# Patient Record
Sex: MALE | Race: WHITE | NOT HISPANIC OR LATINO | Employment: OTHER | URBAN - METROPOLITAN AREA
[De-identification: names, ages, dates, MRNs, and addresses within clinical notes are randomized per-mention and may not be internally consistent; named-entity substitution may affect disease eponyms.]

---

## 2022-12-01 ENCOUNTER — OUTSIDE PLACE OF SERVICE (OUTPATIENT)
Dept: NEPHROLOGY | Facility: CLINIC | Age: 72
End: 2022-12-01

## 2022-12-17 PROCEDURE — 99232 SBSQ HOSP IP/OBS MODERATE 35: CPT | Mod: ,,, | Performed by: INTERNAL MEDICINE

## 2022-12-17 PROCEDURE — 99232 PR SUBSEQUENT HOSPITAL CARE,LEVL II: ICD-10-PCS | Mod: ,,, | Performed by: INTERNAL MEDICINE

## 2022-12-18 PROCEDURE — 99232 SBSQ HOSP IP/OBS MODERATE 35: CPT | Mod: ,,, | Performed by: INTERNAL MEDICINE

## 2022-12-18 PROCEDURE — 99232 PR SUBSEQUENT HOSPITAL CARE,LEVL II: ICD-10-PCS | Mod: ,,, | Performed by: INTERNAL MEDICINE

## 2022-12-19 PROCEDURE — 99232 SBSQ HOSP IP/OBS MODERATE 35: CPT | Mod: ,,, | Performed by: INTERNAL MEDICINE

## 2022-12-19 PROCEDURE — 99232 PR SUBSEQUENT HOSPITAL CARE,LEVL II: ICD-10-PCS | Mod: ,,, | Performed by: INTERNAL MEDICINE

## 2022-12-20 PROCEDURE — 99232 PR SUBSEQUENT HOSPITAL CARE,LEVL II: ICD-10-PCS | Mod: ,,, | Performed by: INTERNAL MEDICINE

## 2022-12-20 PROCEDURE — 99232 SBSQ HOSP IP/OBS MODERATE 35: CPT | Mod: ,,, | Performed by: INTERNAL MEDICINE

## 2022-12-21 PROCEDURE — 99232 SBSQ HOSP IP/OBS MODERATE 35: CPT | Mod: ,,, | Performed by: INTERNAL MEDICINE

## 2022-12-21 PROCEDURE — 99232 PR SUBSEQUENT HOSPITAL CARE,LEVL II: ICD-10-PCS | Mod: ,,, | Performed by: INTERNAL MEDICINE

## 2022-12-22 PROCEDURE — 99232 SBSQ HOSP IP/OBS MODERATE 35: CPT | Mod: ,,, | Performed by: INTERNAL MEDICINE

## 2022-12-22 PROCEDURE — 99232 PR SUBSEQUENT HOSPITAL CARE,LEVL II: ICD-10-PCS | Mod: ,,, | Performed by: INTERNAL MEDICINE

## 2022-12-23 PROCEDURE — 99232 SBSQ HOSP IP/OBS MODERATE 35: CPT | Mod: ,,, | Performed by: INTERNAL MEDICINE

## 2022-12-23 PROCEDURE — 99232 PR SUBSEQUENT HOSPITAL CARE,LEVL II: ICD-10-PCS | Mod: ,,, | Performed by: INTERNAL MEDICINE

## 2022-12-24 PROCEDURE — 99232 PR SUBSEQUENT HOSPITAL CARE,LEVL II: ICD-10-PCS | Mod: ,,, | Performed by: INTERNAL MEDICINE

## 2022-12-24 PROCEDURE — 99232 SBSQ HOSP IP/OBS MODERATE 35: CPT | Mod: ,,, | Performed by: INTERNAL MEDICINE

## 2022-12-25 PROCEDURE — 99232 PR SUBSEQUENT HOSPITAL CARE,LEVL II: ICD-10-PCS | Mod: ,,, | Performed by: INTERNAL MEDICINE

## 2022-12-25 PROCEDURE — 99232 SBSQ HOSP IP/OBS MODERATE 35: CPT | Mod: ,,, | Performed by: INTERNAL MEDICINE

## 2022-12-26 PROCEDURE — 99232 PR SUBSEQUENT HOSPITAL CARE,LEVL II: ICD-10-PCS | Mod: ,,, | Performed by: INTERNAL MEDICINE

## 2022-12-26 PROCEDURE — 99232 SBSQ HOSP IP/OBS MODERATE 35: CPT | Mod: ,,, | Performed by: INTERNAL MEDICINE

## 2022-12-27 PROCEDURE — 99232 SBSQ HOSP IP/OBS MODERATE 35: CPT | Mod: ,,, | Performed by: INTERNAL MEDICINE

## 2022-12-27 PROCEDURE — 99232 PR SUBSEQUENT HOSPITAL CARE,LEVL II: ICD-10-PCS | Mod: ,,, | Performed by: INTERNAL MEDICINE

## 2022-12-28 PROCEDURE — 99232 SBSQ HOSP IP/OBS MODERATE 35: CPT | Mod: ,,, | Performed by: INTERNAL MEDICINE

## 2022-12-28 PROCEDURE — 99232 PR SUBSEQUENT HOSPITAL CARE,LEVL II: ICD-10-PCS | Mod: ,,, | Performed by: INTERNAL MEDICINE

## 2022-12-29 PROCEDURE — 99232 PR SUBSEQUENT HOSPITAL CARE,LEVL II: ICD-10-PCS | Mod: ,,, | Performed by: INTERNAL MEDICINE

## 2022-12-29 PROCEDURE — 99232 SBSQ HOSP IP/OBS MODERATE 35: CPT | Mod: ,,, | Performed by: INTERNAL MEDICINE

## 2023-07-05 ENCOUNTER — TELEPHONE (OUTPATIENT)
Dept: RADIATION ONCOLOGY | Facility: CLINIC | Age: 73
End: 2023-07-05
Payer: MEDICARE

## 2023-07-05 DIAGNOSIS — C44.91 BASAL CELL CARCINOMA: Primary | ICD-10-CM

## 2023-07-06 ENCOUNTER — TELEPHONE (OUTPATIENT)
Dept: RADIATION ONCOLOGY | Facility: CLINIC | Age: 73
End: 2023-07-06
Payer: MEDICARE

## 2023-07-07 ENCOUNTER — TELEPHONE (OUTPATIENT)
Dept: RADIATION ONCOLOGY | Facility: CLINIC | Age: 73
End: 2023-07-07
Payer: MEDICARE

## 2023-07-19 ENCOUNTER — OFFICE VISIT (OUTPATIENT)
Dept: RADIATION ONCOLOGY | Facility: CLINIC | Age: 73
End: 2023-07-19
Payer: MEDICARE

## 2023-07-19 ENCOUNTER — HOSPITAL ENCOUNTER (OUTPATIENT)
Dept: RADIOLOGY | Facility: HOSPITAL | Age: 73
Discharge: HOME | End: 2023-07-19
Attending: STUDENT IN AN ORGANIZED HEALTH CARE EDUCATION/TRAINING PROGRAM
Payer: MEDICARE

## 2023-07-19 VITALS
OXYGEN SATURATION: 98 % | TEMPERATURE: 98 F | DIASTOLIC BLOOD PRESSURE: 77 MMHG | SYSTOLIC BLOOD PRESSURE: 156 MMHG | WEIGHT: 181.69 LBS | HEART RATE: 52 BPM

## 2023-07-19 DIAGNOSIS — R22.1 NECK MASS: ICD-10-CM

## 2023-07-19 DIAGNOSIS — C44.90 SKIN CANCER: ICD-10-CM

## 2023-07-19 DIAGNOSIS — R22.1 NECK MASS: Primary | ICD-10-CM

## 2023-07-19 PROCEDURE — 3288F PR FALLS RISK ASSESSMENT DOCUMENTED: ICD-10-PCS | Mod: CPTII,S$GLB,, | Performed by: STUDENT IN AN ORGANIZED HEALTH CARE EDUCATION/TRAINING PROGRAM

## 2023-07-19 PROCEDURE — 3078F PR MOST RECENT DIASTOLIC BLOOD PRESSURE < 80 MM HG: ICD-10-PCS | Mod: CPTII,S$GLB,, | Performed by: STUDENT IN AN ORGANIZED HEALTH CARE EDUCATION/TRAINING PROGRAM

## 2023-07-19 PROCEDURE — 3078F DIAST BP <80 MM HG: CPT | Mod: CPTII,S$GLB,, | Performed by: STUDENT IN AN ORGANIZED HEALTH CARE EDUCATION/TRAINING PROGRAM

## 2023-07-19 PROCEDURE — 3077F PR MOST RECENT SYSTOLIC BLOOD PRESSURE >= 140 MM HG: ICD-10-PCS | Mod: CPTII,S$GLB,, | Performed by: STUDENT IN AN ORGANIZED HEALTH CARE EDUCATION/TRAINING PROGRAM

## 2023-07-19 PROCEDURE — 1126F PR PAIN SEVERITY QUANTIFIED, NO PAIN PRESENT: ICD-10-PCS | Mod: CPTII,S$GLB,, | Performed by: STUDENT IN AN ORGANIZED HEALTH CARE EDUCATION/TRAINING PROGRAM

## 2023-07-19 PROCEDURE — 99205 OFFICE O/P NEW HI 60 MIN: CPT | Mod: S$GLB,,, | Performed by: STUDENT IN AN ORGANIZED HEALTH CARE EDUCATION/TRAINING PROGRAM

## 2023-07-19 PROCEDURE — 1159F PR MEDICATION LIST DOCUMENTED IN MEDICAL RECORD: ICD-10-PCS | Mod: CPTII,S$GLB,, | Performed by: STUDENT IN AN ORGANIZED HEALTH CARE EDUCATION/TRAINING PROGRAM

## 2023-07-19 PROCEDURE — 1159F MED LIST DOCD IN RCRD: CPT | Mod: CPTII,S$GLB,, | Performed by: STUDENT IN AN ORGANIZED HEALTH CARE EDUCATION/TRAINING PROGRAM

## 2023-07-19 PROCEDURE — 99999 PR PBB SHADOW E&M-EST. PATIENT-LVL IV: ICD-10-PCS | Mod: PBBFAC,,, | Performed by: STUDENT IN AN ORGANIZED HEALTH CARE EDUCATION/TRAINING PROGRAM

## 2023-07-19 PROCEDURE — 99205 PR OFFICE/OUTPT VISIT, NEW, LEVL V, 60-74 MIN: ICD-10-PCS | Mod: S$GLB,,, | Performed by: STUDENT IN AN ORGANIZED HEALTH CARE EDUCATION/TRAINING PROGRAM

## 2023-07-19 PROCEDURE — 3077F SYST BP >= 140 MM HG: CPT | Mod: CPTII,S$GLB,, | Performed by: STUDENT IN AN ORGANIZED HEALTH CARE EDUCATION/TRAINING PROGRAM

## 2023-07-19 PROCEDURE — 1101F PR PT FALLS ASSESS DOC 0-1 FALLS W/OUT INJ PAST YR: ICD-10-PCS | Mod: CPTII,S$GLB,, | Performed by: STUDENT IN AN ORGANIZED HEALTH CARE EDUCATION/TRAINING PROGRAM

## 2023-07-19 PROCEDURE — 70490 CT SOFT TISSUE NECK W/O DYE: CPT | Mod: TC

## 2023-07-19 PROCEDURE — 1126F AMNT PAIN NOTED NONE PRSNT: CPT | Mod: CPTII,S$GLB,, | Performed by: STUDENT IN AN ORGANIZED HEALTH CARE EDUCATION/TRAINING PROGRAM

## 2023-07-19 PROCEDURE — 1101F PT FALLS ASSESS-DOCD LE1/YR: CPT | Mod: CPTII,S$GLB,, | Performed by: STUDENT IN AN ORGANIZED HEALTH CARE EDUCATION/TRAINING PROGRAM

## 2023-07-19 PROCEDURE — 3288F FALL RISK ASSESSMENT DOCD: CPT | Mod: CPTII,S$GLB,, | Performed by: STUDENT IN AN ORGANIZED HEALTH CARE EDUCATION/TRAINING PROGRAM

## 2023-07-19 PROCEDURE — 99999 PR PBB SHADOW E&M-EST. PATIENT-LVL IV: CPT | Mod: PBBFAC,,, | Performed by: STUDENT IN AN ORGANIZED HEALTH CARE EDUCATION/TRAINING PROGRAM

## 2023-07-19 RX ORDER — LEVOTHYROXINE SODIUM 75 UG/1
75 TABLET ORAL
COMMUNITY

## 2023-07-19 RX ORDER — SUCRALFATE 1 G/1
1 TABLET ORAL DAILY
COMMUNITY

## 2023-07-19 RX ORDER — MIDODRINE HYDROCHLORIDE 2.5 MG/1
5 TABLET ORAL 3 TIMES DAILY
COMMUNITY

## 2023-07-19 RX ORDER — SODIUM BICARBONATE 650 MG/1
650 TABLET ORAL 4 TIMES DAILY
COMMUNITY

## 2023-07-19 RX ORDER — CARVEDILOL 25 MG/1
25 TABLET ORAL 2 TIMES DAILY WITH MEALS
COMMUNITY

## 2023-07-19 RX ORDER — CLOPIDOGREL BISULFATE 75 MG/1
75 TABLET ORAL DAILY
COMMUNITY

## 2023-07-19 RX ORDER — DIGOXIN 125 MCG
125 TABLET ORAL DAILY
COMMUNITY

## 2023-07-19 RX ORDER — CLOBETASOL PROPIONATE 0.5 MG/G
CREAM TOPICAL 2 TIMES DAILY
COMMUNITY

## 2023-07-19 RX ORDER — ATORVASTATIN CALCIUM 40 MG/1
40 TABLET, FILM COATED ORAL DAILY
COMMUNITY

## 2023-07-19 RX ORDER — PANTOPRAZOLE SODIUM 40 MG/1
40 TABLET, DELAYED RELEASE ORAL DAILY
COMMUNITY
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
## 2023-07-21 DIAGNOSIS — R22.1 NECK MASS: Primary | ICD-10-CM

## 2023-07-21 DIAGNOSIS — C44.90 SKIN CANCER: ICD-10-CM

## 2023-07-21 NOTE — PROGRESS NOTES
Discussed results of CT neck with a soft tissue mass in left IB.   US guided biopsy  Referral to H&N surgery    Skin cancers:   Dr. Corral to reach out and set up follow up and discussion of Mohs. For nose lesion, if not amendable to Mohs without significant cosmetic defect, will plan for Rt. Offered [place], but they would rather [place].       Go Correa MD  Radiation Oncology

## 2023-07-24 NOTE — PROGRESS NOTES
Spoke with pt's caregiver, they declined appt at main campus. Scheduled to see Dr. Yanez first available in [place] on 8/4/23. Reviewed details & confirmed.

## 2023-08-04 ENCOUNTER — HOSPITAL ENCOUNTER (OUTPATIENT)
Dept: RADIOLOGY | Facility: HOSPITAL | Age: 73
Discharge: HOME | End: 2023-08-04
Attending: OTOLARYNGOLOGY
Payer: MEDICARE

## 2023-08-04 ENCOUNTER — OFFICE VISIT (OUTPATIENT)
Dept: HEMATOLOGY/ONCOLOGY | Facility: CLINIC | Age: 73
End: 2023-08-04
Payer: MEDICARE

## 2023-08-04 VITALS
HEART RATE: 56 BPM | SYSTOLIC BLOOD PRESSURE: 147 MMHG | WEIGHT: 184.75 LBS | DIASTOLIC BLOOD PRESSURE: 83 MMHG | OXYGEN SATURATION: 98 % | RESPIRATION RATE: 16 BRPM | BODY MASS INDEX: 25.86 KG/M2 | TEMPERATURE: 97 F | HEIGHT: 71 IN

## 2023-08-04 DIAGNOSIS — C44.90 SKIN CANCER: ICD-10-CM

## 2023-08-04 DIAGNOSIS — R22.1 NECK MASS: ICD-10-CM

## 2023-08-04 DIAGNOSIS — D37.039 NEOPLASM OF UNCERTAIN BEHAVIOR OF MAJOR SALIVARY GLAND: Primary | ICD-10-CM

## 2023-08-04 DIAGNOSIS — D37.039 NEOPLASM OF UNCERTAIN BEHAVIOR OF MAJOR SALIVARY GLAND: ICD-10-CM

## 2023-08-04 DIAGNOSIS — R59.0 LOCALIZED ENLARGED LYMPH NODES: ICD-10-CM

## 2023-08-04 DIAGNOSIS — N18.4 CKD (CHRONIC KIDNEY DISEASE), STAGE IV: ICD-10-CM

## 2023-08-04 PROCEDURE — 70490 CT SOFT TISSUE NECK W/O DYE: CPT | Mod: 26,,, | Performed by: RADIOLOGY

## 2023-08-04 PROCEDURE — 1160F RVW MEDS BY RX/DR IN RCRD: CPT | Mod: CPTII,S$GLB,, | Performed by: OTOLARYNGOLOGY

## 2023-08-04 PROCEDURE — 1160F PR REVIEW ALL MEDS BY PRESCRIBER/CLIN PHARMACIST DOCUMENTED: ICD-10-PCS | Mod: CPTII,S$GLB,, | Performed by: OTOLARYNGOLOGY

## 2023-08-04 PROCEDURE — 1101F PT FALLS ASSESS-DOCD LE1/YR: CPT | Mod: CPTII,S$GLB,, | Performed by: OTOLARYNGOLOGY

## 2023-08-04 PROCEDURE — 3288F PR FALLS RISK ASSESSMENT DOCUMENTED: ICD-10-PCS | Mod: CPTII,S$GLB,, | Performed by: OTOLARYNGOLOGY

## 2023-08-04 PROCEDURE — 31575 PR LARYNGOSCOPY, FLEXIBLE; DIAGNOSTIC: ICD-10-PCS | Mod: S$GLB,,, | Performed by: OTOLARYNGOLOGY

## 2023-08-04 PROCEDURE — 1126F PR PAIN SEVERITY QUANTIFIED, NO PAIN PRESENT: ICD-10-PCS | Mod: CPTII,S$GLB,, | Performed by: OTOLARYNGOLOGY

## 2023-08-04 PROCEDURE — 3008F BODY MASS INDEX DOCD: CPT | Mod: CPTII,S$GLB,, | Performed by: OTOLARYNGOLOGY

## 2023-08-04 PROCEDURE — 1159F PR MEDICATION LIST DOCUMENTED IN MEDICAL RECORD: ICD-10-PCS | Mod: CPTII,S$GLB,, | Performed by: OTOLARYNGOLOGY

## 2023-08-04 PROCEDURE — 70490 CT SOFT TISSUE NECK W/O DYE: CPT | Mod: TC,PO

## 2023-08-04 PROCEDURE — 99999 PR PBB SHADOW E&M-EST. PATIENT-LVL V: CPT | Mod: PBBFAC,,, | Performed by: OTOLARYNGOLOGY

## 2023-08-04 PROCEDURE — 1126F AMNT PAIN NOTED NONE PRSNT: CPT | Mod: CPTII,S$GLB,, | Performed by: OTOLARYNGOLOGY

## 2023-08-04 PROCEDURE — 3079F PR MOST RECENT DIASTOLIC BLOOD PRESSURE 80-89 MM HG: ICD-10-PCS | Mod: CPTII,S$GLB,, | Performed by: OTOLARYNGOLOGY

## 2023-08-04 PROCEDURE — 99999 PR PBB SHADOW E&M-EST. PATIENT-LVL V: ICD-10-PCS | Mod: PBBFAC,,, | Performed by: OTOLARYNGOLOGY

## 2023-08-04 PROCEDURE — 3008F PR BODY MASS INDEX (BMI) DOCUMENTED: ICD-10-PCS | Mod: CPTII,S$GLB,, | Performed by: OTOLARYNGOLOGY

## 2023-08-04 PROCEDURE — 3077F SYST BP >= 140 MM HG: CPT | Mod: CPTII,S$GLB,, | Performed by: OTOLARYNGOLOGY

## 2023-08-04 PROCEDURE — 99204 OFFICE O/P NEW MOD 45 MIN: CPT | Mod: 25,S$GLB,, | Performed by: OTOLARYNGOLOGY

## 2023-08-04 PROCEDURE — 31575 DIAGNOSTIC LARYNGOSCOPY: CPT | Mod: S$GLB,,, | Performed by: OTOLARYNGOLOGY

## 2023-08-04 PROCEDURE — 1159F MED LIST DOCD IN RCRD: CPT | Mod: CPTII,S$GLB,, | Performed by: OTOLARYNGOLOGY

## 2023-08-04 PROCEDURE — 70490 CT SOFT TISSUE NECK WITHOUT CONTRAST: ICD-10-PCS | Mod: 26,,, | Performed by: RADIOLOGY

## 2023-08-04 PROCEDURE — 3288F FALL RISK ASSESSMENT DOCD: CPT | Mod: CPTII,S$GLB,, | Performed by: OTOLARYNGOLOGY

## 2023-08-04 PROCEDURE — 3077F PR MOST RECENT SYSTOLIC BLOOD PRESSURE >= 140 MM HG: ICD-10-PCS | Mod: CPTII,S$GLB,, | Performed by: OTOLARYNGOLOGY

## 2023-08-04 PROCEDURE — 1101F PR PT FALLS ASSESS DOC 0-1 FALLS W/OUT INJ PAST YR: ICD-10-PCS | Mod: CPTII,S$GLB,, | Performed by: OTOLARYNGOLOGY

## 2023-08-04 PROCEDURE — 99204 PR OFFICE/OUTPT VISIT, NEW, LEVL IV, 45-59 MIN: ICD-10-PCS | Mod: 25,S$GLB,, | Performed by: OTOLARYNGOLOGY

## 2023-08-04 PROCEDURE — 3079F DIAST BP 80-89 MM HG: CPT | Mod: CPTII,S$GLB,, | Performed by: OTOLARYNGOLOGY

## 2023-08-04 RX ORDER — CETIRIZINE HYDROCHLORIDE 10 MG/1
10 TABLET ORAL 2 TIMES DAILY
COMMUNITY

## 2023-08-04 NOTE — PATIENT INSTRUCTIONS
Please get the CT scan and the biopsy. I will see you afterwards, and we will go from there.   I will talk with Dr. Correa about the nose BCC as well.

## 2023-08-04 NOTE — ASSESSMENT & PLAN NOTE
He has a suspicious left level I mass that seems like a lymph node on both the original and the repeat CT scan. He needs an IR-FNA/core, and I have ordered this. Because this yashira level can be the primary basin for midface cutaneous malignancy, I am curious if the nasal dorsum lesion could be a basosquamous mashup. We will try to get the slides for internal review. The nose was unremarkable in real time, although there was some normal appearing crusting on the sidewall that looks strange on the photo included in the chart. I will take another look in the nose when he returns after the FNA.
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
## 2023-08-15 ENCOUNTER — TELEPHONE (OUTPATIENT)
Dept: SURGERY | Facility: CLINIC | Age: 73
End: 2023-08-15
Payer: MEDICARE

## 2023-08-15 DIAGNOSIS — D37.039 NEOPLASM OF UNCERTAIN BEHAVIOR OF MAJOR SALIVARY GLAND: Primary | ICD-10-CM

## 2023-08-15 NOTE — TELEPHONE ENCOUNTER
Request for slides to be sent to [facility] faxed to [facility].    Accession # [id number]

## 2023-08-16 ENCOUNTER — TELEPHONE (OUTPATIENT)
Dept: SURGERY | Facility: CLINIC | Age: 73
End: 2023-08-16
Payer: MEDICARE

## 2023-08-16 NOTE — TELEPHONE ENCOUNTER
Called [facility] and verified that request for pathology slides was received.

## 2023-08-22 ENCOUNTER — TELEPHONE (OUTPATIENT)
Dept: SURGERY | Facility: CLINIC | Age: 73
End: 2023-08-22
Payer: MEDICARE

## 2023-08-22 ENCOUNTER — TELEPHONE (OUTPATIENT)
Dept: HEMATOLOGY/ONCOLOGY | Facility: CLINIC | Age: 73
End: 2023-08-22
Payer: MEDICARE

## 2023-08-22 NOTE — TELEPHONE ENCOUNTER
Returned call X 3, no answer, no VM to either #.   ----- Message from Brittany Marie RN sent at 8/21/2023 10:36 AM CDT -----  Contact: Mary jones' @ [phone]    ----- Message -----  From: Abi Leger  Sent: 8/21/2023  10:32 AM CDT  To: [facility] Staff    Type:  Needs Medical Advice    Who Called: Mary Jones/ Jane  Would the patient rather a call back or a response via [facility]? Call mary jones'  Best Call Back Number: [phone]  Additional Information: Mary nick would like a call back in regards to pt's procedure. Please call ptdemi jones back to advise.

## 2023-09-01 ENCOUNTER — OFFICE VISIT (OUTPATIENT)
Dept: HEMATOLOGY/ONCOLOGY | Facility: CLINIC | Age: 73
End: 2023-09-01
Payer: MEDICARE

## 2023-09-01 VITALS
DIASTOLIC BLOOD PRESSURE: 75 MMHG | WEIGHT: 185.44 LBS | RESPIRATION RATE: 18 BRPM | BODY MASS INDEX: 25.96 KG/M2 | OXYGEN SATURATION: 98 % | TEMPERATURE: 97 F | HEART RATE: 62 BPM | SYSTOLIC BLOOD PRESSURE: 125 MMHG | HEIGHT: 71 IN

## 2023-09-01 DIAGNOSIS — C08.0: Primary | ICD-10-CM

## 2023-09-01 DIAGNOSIS — I48.3 TYPICAL ATRIAL FLUTTER: ICD-10-CM

## 2023-09-01 DIAGNOSIS — C08.9 SALIVARY GLAND CARCINOMA: ICD-10-CM

## 2023-09-01 PROCEDURE — 3078F PR MOST RECENT DIASTOLIC BLOOD PRESSURE < 80 MM HG: ICD-10-PCS | Mod: CPTII,S$GLB,, | Performed by: OTOLARYNGOLOGY

## 2023-09-01 PROCEDURE — 99214 PR OFFICE/OUTPT VISIT, EST, LEVL IV, 30-39 MIN: ICD-10-PCS | Mod: S$GLB,,, | Performed by: OTOLARYNGOLOGY

## 2023-09-01 PROCEDURE — 1126F PR PAIN SEVERITY QUANTIFIED, NO PAIN PRESENT: ICD-10-PCS | Mod: CPTII,S$GLB,, | Performed by: OTOLARYNGOLOGY

## 2023-09-01 PROCEDURE — 3008F BODY MASS INDEX DOCD: CPT | Mod: CPTII,S$GLB,, | Performed by: OTOLARYNGOLOGY

## 2023-09-01 PROCEDURE — 1126F AMNT PAIN NOTED NONE PRSNT: CPT | Mod: CPTII,S$GLB,, | Performed by: OTOLARYNGOLOGY

## 2023-09-01 PROCEDURE — 3078F DIAST BP <80 MM HG: CPT | Mod: CPTII,S$GLB,, | Performed by: OTOLARYNGOLOGY

## 2023-09-01 PROCEDURE — 3074F PR MOST RECENT SYSTOLIC BLOOD PRESSURE < 130 MM HG: ICD-10-PCS | Mod: CPTII,S$GLB,, | Performed by: OTOLARYNGOLOGY

## 2023-09-01 PROCEDURE — 99999 PR PBB SHADOW E&M-EST. PATIENT-LVL V: ICD-10-PCS | Mod: PBBFAC,,, | Performed by: OTOLARYNGOLOGY

## 2023-09-01 PROCEDURE — 99999 PR PBB SHADOW E&M-EST. PATIENT-LVL V: CPT | Mod: PBBFAC,,, | Performed by: OTOLARYNGOLOGY

## 2023-09-01 PROCEDURE — 3074F SYST BP LT 130 MM HG: CPT | Mod: CPTII,S$GLB,, | Performed by: OTOLARYNGOLOGY

## 2023-09-01 PROCEDURE — 99214 OFFICE O/P EST MOD 30 MIN: CPT | Mod: S$GLB,,, | Performed by: OTOLARYNGOLOGY

## 2023-09-01 PROCEDURE — 3008F PR BODY MASS INDEX (BMI) DOCUMENTED: ICD-10-PCS | Mod: CPTII,S$GLB,, | Performed by: OTOLARYNGOLOGY

## 2023-09-01 RX ORDER — NITROGLYCERIN 0.4 MG/1
0.4 TABLET SUBLINGUAL
COMMUNITY
Start: 2022-12-14

## 2023-09-01 RX ORDER — SODIUM CHLORIDE 0.9 % (FLUSH) 0.9 %
10 SYRINGE (ML) INJECTION
Status: CANCELLED | OUTPATIENT
Start: 2023-09-01

## 2023-09-01 RX ORDER — FENOFIBRATE 145 MG/1
145 TABLET, FILM COATED ORAL
COMMUNITY
Start: 2023-08-17

## 2023-09-01 RX ORDER — CLINDAMYCIN PHOSPHATE 10 UG/ML
LOTION TOPICAL
COMMUNITY
Start: 2023-05-26

## 2023-09-01 NOTE — ASSESSMENT & PLAN NOTE
Hiren Rosa is a 73 y.o. 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 The picture does not neatly add up, so I suspect that the nasal lesion is actually a hybrid tumor and the neuroendocrine features are more reflective of poorly differentiated SCC. But we will see, as the only way to get more information is to get a PET-CT to complete his staging and then proceed to surgery to address those 3 lesions. Final pathology can guide us to appropriate adjuvant therapies, which could be radiation, with or without chemotherapy or immunotherapy.     I have therefore offered the following: wide local excision of the nasal lesion, left modified neck dissection, and wide local excision of the lower neck lesion. We can do this on 9/25 at his request because it is more convenient than 9/11. I have arranged for Dr. Negron to perform reconstruction of the nasal defect, and this will happen the following day as a planned return to the OR once margins are confirmed clear.     The risks of facial cutaneous excision were described to include, but not be limited to, infection, bleeding, scarring, recurrence, numbness, facial weakness, failure to achieve an adequate diagnosis, and the need for additional procedures. Time was allowed for questions, and all questions were answered to his apparent satisfaction. I explained that the defects will be large, and that the nasal wound will require at least 2 operations to close it, which will be extensive in their own right.     We discussed the risks, benefits, and indications to left neck dissection. The risks were noted to include, but not be limited to, infection, bleeding, scarring, collection of blood or tissue fluid requiring drainage, weakness of the lip which  may be temporary or permanent, weakness of the tongue which could be temporary or permanent and cause speech and/or swallowing difficulty, weakness of the shoulder which could be temporary or permanent, pain, chyle leakage which would require dietary modification and perhaps additional procedures, and the need for additional procedures. Time was allowed for questions, and all questions were answered to the patient's apparent satisfaction.     Given the midline nasal tumor, bilateral basins are theoretically at risk. But, if this is involved, the tumor has mapped itself. Because of the uncertainty around the final diagnosis, surgery seems to me to be the most expeditious way to get answers and to get the disease addressed. He agrees with this plan.

## 2023-09-01 NOTE — PATIENT INSTRUCTIONS
Surgery/procedure time and date: 9/25/23 @ 1100                             Arrival time: 0900  (usually at least 1 hour prior to surgery/procedure)    Stop ALL solid food, gum, candy (including vitamins) 8 hours before surgery/procedure time.  Stop all CLOUDY liquids: coffee with creamer, formula, tube feeds, cloudy juices, non-human milk and breast milk with additives, 6 hours prior to surgery/procedure time.  Stop plain breast milk 4 hours prior to surgery/procedure time.  The patient should be ENCOURAGED to drink carbohydrate-rich clear liquids (sports drinks, clear juices) until 2 hours prior to surgery/procedure time.  CLEAR liquids include only water, black coffee NO creamer, clear oral rehydration drinks, clear sports drinks or clear fruit juices (no orange juice, no pulpy juices, no apple cider). Advise patients if they can read newsprint through the liquid, it qualifies as clear liquid.   IF IN DOUBT, drink water instead.   NOTHING TO EAT OR DRINK 2 hours before to surgery/procedure time. If you are told to take medication on the morning of surgery, it may be taken with a sip of water.     The team will call the week before (or the day before) surgery to tell you the arrival time.    The anesthesia preop center will call to ask you some questions before surgery.     Please stop aspirin 2 weeks before surgery, plavix 1 week before surgery, and other blood thinners 5 days before surgery, if indicated. Sometimes the anesthesia team or your doctors will want you stay on at least one blood thinner - they will let you know.     For your day of surgery, please come to [facility] on the 2nd floor of the main hospital - follow the signs.

## 2023-09-01 NOTE — PROGRESS NOTES
HEAD AND NECK SURGICAL ONCOLOGY CLINIC    Subjective:       Patient ID: Hiren Rosa is a 73 y.o. male.    Chief Complaint: Establish Care (S/P FNA 08/25)    HPI    Hiren Rosa is a 73 y.o. male who has been referred by Dr. Correa for a left neck mass. It has been there for at least one month, and he thinks it is getting bigger. There is tenderness to palpation. He had been referred to Dr. 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 Perdomo. He has a long history of sun exposure, with some bad sunburns as a kid - he used to work as a . He has a history of a prior SCC of the right temple that was managed with local therapy.     He denies dysphagia, odynophagia, throat pain, and otalgia. His voice has been hoarse for about a year - it has gotten worse since his CAB in December. There is no hemoptysis or hematemesis. He is breathing well.    He is a retired  from [employer], and he was in law enforcement.     Past Medical History:   Diagnosis Date    Disorder of kidney and ureter     GERD (gastroesophageal reflux disease)     Hypertension     Skin cancer     Thyroid disease        Past Surgical History:   Procedure Laterality Date    AORTIC VALVE REPLACEMENT      CORONARY ARTERY BYPASS GRAFT         Current Outpatient Medications:     apixaban (ELIQUIS) 2.5 mg Tab, Take by mouth 2 (two) times daily., Disp: , Rfl:     atorvastatin (LIPITOR) 40 MG tablet, Take 40 mg by mouth once daily., Disp: , Rfl:     carvediloL (COREG) 25 MG tablet, Take 25 mg by mouth 2 (two) times daily with meals., Disp: , Rfl:     cetirizine (ZYRTEC) 10 MG tablet, Take 10 mg by mouth 2 (two) times a day., Disp: , Rfl:     clindamycin (CLEOCIN T) 1 % lotion, SMARTSIG:gel Topical Twice Daily, Disp: , Rfl:     clobetasoL (TEMOVATE) 0.05 % cream, Apply topically 2 (two) times daily., Disp: , Rfl:     clopidogreL (PLAVIX) 75 mg tablet, Take 75 mg by mouth once  daily., Disp: , Rfl:     digoxin (LANOXIN) 125 mcg tablet, Take 125 mcg by mouth once daily., Disp: , Rfl:     fenofibrate (TRICOR) 145 MG tablet, Take 145 mg by mouth., Disp: , Rfl:     iron, carbonyl, (ICAR) 15 mg/1.25 mL Susp, Take by mouth., Disp: , Rfl:     levothyroxine (SYNTHROID) 75 MCG tablet, Take 75 mcg by mouth before breakfast., Disp: , Rfl:     LORATADINE ORAL, Take 10 mg by mouth once daily., Disp: , Rfl:     midodrine (PROAMATINE) 2.5 MG Tab, Take 5 mg by mouth 3 (three) times daily., Disp: , Rfl:     nitroGLYCERIN (NITROSTAT) 0.4 MG SL tablet, Place 0.4 mg under the tongue., Disp: , Rfl:     pantoprazole (PROTONIX) 40 MG tablet, Take 40 mg by mouth once daily., Disp: , Rfl:     sodium bicarbonate 650 MG tablet, Take 650 mg by mouth 4 (four) times daily., Disp: , Rfl:     sucralfate (CARAFATE) 1 gram tablet, Take 1 g by mouth once daily., Disp: , Rfl:     TUMERIC-GING-OLIVE-OREG-CAPRYL ORAL, Take by mouth., Disp: , Rfl:     fenofibrate 160 MG Tab, Take 160 mg by mouth once daily., Disp: , Rfl:     Review of patient's allergies indicates:  No Known Allergies    Social History     Socioeconomic History    Marital status: Significant Other   Tobacco Use    Smoking status: Former     Types: Cigarettes    Smokeless tobacco: Never   Substance and Sexual Activity    Alcohol use: Not Currently     Alcohol/week: 1.0 standard drink of alcohol     Types: 1 Drinks containing 0.5 oz of alcohol per week    Drug use: Never       Family History   Problem Relation Age of Onset    Cancer Father     Cancer Brother        Review of Systems   Constitutional:  Negative for fatigue, fever and unexpected weight change.   HENT:  Negative for ear discharge, facial swelling, hearing loss, mouth sores, rhinorrhea, sore throat, tinnitus, trouble swallowing and voice change.    Eyes:  Negative for pain and visual disturbance.   Respiratory:  Negative for cough and shortness of breath.    Cardiovascular:  Negative for chest pain  and palpitations.   Gastrointestinal:  Negative for abdominal pain, constipation and diarrhea.   Genitourinary:  Negative for difficulty urinating and dysuria.   Musculoskeletal:  Positive for arthralgias. Negative for back pain and neck pain.   Skin:  Positive for wound. Negative for color change and rash.        Skin lesion on dorsum as above   Neurological:  Negative for dizziness, seizures and headaches.   Hematological:  Positive for adenopathy. Does not bruise/bleed easily.   Psychiatric/Behavioral:  Negative for agitation. The patient is not nervous/anxious.        Objective:     Physical Exam  Vitals reviewed.   Constitutional:       Appearance: Normal appearance.   HENT:      Head: Normocephalic and atraumatic.      Comments:        Right Ear: Tympanic membrane, ear canal and external ear normal. No decreased hearing noted.      Left Ear: Tympanic membrane, ear canal and external ear normal. No decreased hearing noted.      Nose: Nose normal. No rhinorrhea.        Mouth/Throat:      Palate: No mass and lesions.   Eyes:      Extraocular Movements: Extraocular movements intact.      Conjunctiva/sclera: Conjunctivae normal.   Neck:        Comments: Salivary glands - there are no lesions or asymmetric findings in the submandibular or parotid glands  Pulmonary:      Effort: Pulmonary effort is normal. No respiratory distress.      Breath sounds: Normal breath sounds. No stridor.   Musculoskeletal:      Right shoulder: No deformity. Normal range of motion.      Left shoulder: No deformity. Normal range of motion.      Cervical back: Normal range of motion and neck supple.   Lymphadenopathy:      Cervical: Cervical adenopathy present.      Left cervical: Deep cervical adenopathy present.   Skin:     General: Skin is warm and dry.      Findings: No lesion.   Neurological:      Mental Status: He is alert and oriented to person, place, and time.      Cranial Nerves: No cranial nerve deficit or facial asymmetry.       Motor: No weakness.      Gait: Gait normal.                            Pathology Review   -[id number] - both biopsies of nasal dorsum are BCC    Assessment & Plan:       Problem List Items Addressed This Visit       Malignant neoplasm submandibular gland - Primary     Hiren Rosa is a 73 y.o. 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 The picture does not neatly add up, so I suspect that the nasal lesion is actually a hybrid tumor and the neuroendocrine features are more reflective of poorly differentiated SCC. But we will see, as the only way to get more information is to get a PET-CT to complete his staging and then proceed to surgery to address those 3 lesions. Final pathology can guide us to appropriate adjuvant therapies, which could be radiation, with or without chemotherapy or immunotherapy.     I have therefore offered the following: wide local excision of the nasal lesion, left modified neck dissection, and wide local excision of the lower neck lesion. We can do this on 9/25 at his request because it is more convenient than 9/11. I have arranged for Dr. Negron to perform reconstruction of the nasal defect, and this will happen the following day as a planned return to the OR once margins are confirmed clear.     The risks of facial cutaneous excision were described to include, but not be limited to, infection, bleeding, scarring, recurrence, numbness, facial weakness, failure to achieve an adequate diagnosis, and the need for additional procedures. Time was allowed for questions, and all questions were answered to his apparent satisfaction. I explained that the defects will be large, and that the nasal wound will require at least 2 operations to close it, which will be extensive  in their own right.     We discussed the risks, benefits, and indications to left neck dissection. The risks were noted to include, but not be limited to, infection, bleeding, scarring, collection of blood or tissue fluid requiring drainage, weakness of the lip which may be temporary or permanent, weakness of the tongue which could be temporary or permanent and cause speech and/or swallowing difficulty, weakness of the shoulder which could be temporary or permanent, pain, chyle leakage which would require dietary modification and perhaps additional procedures, and the need for additional procedures. Time was allowed for questions, and all questions were answered to the patient's apparent satisfaction.     Given the midline nasal tumor, bilateral basins are theoretically at risk. But, if this is involved, the tumor has mapped itself. Because of the uncertainty around the final diagnosis, surgery seems to me to be the most expeditious way to get answers and to get the disease addressed. He agrees with this plan.            Relevant Orders    Case Request Operating Room: DISSECTION, NECK, EXCISION, LESION, FACE, EXCISION, LESION, CREATION, FLAP, ROTATION (Completed)    NM PET CT Routine Skull to Mid Thigh (Completed)     Other Visit Diagnoses       Salivary gland carcinoma

## 2023-09-07 ENCOUNTER — HOSPITAL ENCOUNTER (OUTPATIENT)
Dept: RADIOLOGY | Facility: HOSPITAL | Age: 73
Discharge: HOME | End: 2023-09-07
Attending: OTOLARYNGOLOGY
Payer: MEDICARE

## 2023-09-07 DIAGNOSIS — C08.0: ICD-10-CM

## 2023-09-07 LAB — GLUCOSE SERPL-MCNC: 93 MG/DL (ref 70–110)

## 2023-09-07 PROCEDURE — A9552 F18 FDG: HCPCS | Mod: PN

## 2023-09-07 PROCEDURE — 78815 PET IMAGE W/CT SKULL-THIGH: CPT | Mod: 26,PI,, | Performed by: RADIOLOGY

## 2023-09-07 PROCEDURE — 78815 NM PET CT ROUTINE: ICD-10-PCS | Mod: 26,PI,, | Performed by: RADIOLOGY

## 2023-09-08 ENCOUNTER — TELEPHONE (OUTPATIENT)
Dept: OTOLARYNGOLOGY | Facility: CLINIC | Age: 73
End: 2023-09-08
Payer: MEDICARE

## 2023-09-08 NOTE — TELEPHONE ENCOUNTER
I called and spoke with his wife. We reviewed the PET-CT that did not show anything beyond what we knew. We plan surgery to remove the cancer on the nose and clavicle, as well as the neck lymph nodes, on the 25th. To ensure negative margins, reconstruction will occur the next day or at least during that hospitalization, with Dr. Negron. They are in full agreement with this plan.

## 2023-09-10 PROBLEM — I48.3 TYPICAL ATRIAL FLUTTER: Status: ACTIVE | Noted: 2023-09-10

## 2023-09-14 DIAGNOSIS — Z01.818 PRE-OP TESTING: Primary | ICD-10-CM

## 2023-09-18 ENCOUNTER — TELEPHONE (OUTPATIENT)
Dept: OTOLARYNGOLOGY | Facility: CLINIC | Age: 73
End: 2023-09-18
Payer: MEDICARE

## 2023-09-18 NOTE — TELEPHONE ENCOUNTER
I called and discussed a scheduling issue with his significant other because he did not answer his cell phone and does not have a voice mail box that is set up.     Due to an unavoidable professional conflict, I need to move his surgery back one week to Monday, October 2. It will likely be first thing in the morning. She expressed understanding. I will move the case.     The plan is the same as before - resection of the nasal and lower neck lesions, neck dissection, and delayed nasal reconstruction during the same hospitalization.

## 2023-09-28 ENCOUNTER — TELEPHONE (OUTPATIENT)
Dept: HEMATOLOGY/ONCOLOGY | Facility: CLINIC | Age: 73
End: 2023-09-28
Payer: MEDICARE

## 2023-09-28 NOTE — TELEPHONE ENCOUNTER
Notified pt that I am awaiting a new OR date from Dr Yanez and will call him when I have a date for him.  Pt verbalized agreement with plan.   ----- Message from Brittany Marie RN sent at 9/28/2023  1:52 PM CDT -----    ----- Message -----  From: Natalie Swenson  Sent: 9/28/2023  11:39 AM CDT  To: [facility] Staff    Type: Needs Medical Advice  Who Called:  Jane (care taker)  Symptoms (please be specific):    How long has patient had these symptoms:    Pharmacy name and phone #:    Best Call Back Number: [phone] / [phone]  Additional Information: Jane is returning a call for the patient from

## 2023-09-28 NOTE — TELEPHONE ENCOUNTER
Pt's significant other calling to inform Dr Yanez that pt needs to postpone surgery scheduled Monday until November.    Called pt's mobile # to notify him that I received the message, no answer, no VM.  Will notify Dr Yanez and give pt new OR date.

## 2023-09-29 DIAGNOSIS — Z98.890 MOHS DEFECT OF NOSE: Primary | ICD-10-CM

## 2023-09-29 DIAGNOSIS — M95.0 MOHS DEFECT OF NOSE: Primary | ICD-10-CM

## 2023-10-13 ENCOUNTER — TELEPHONE (OUTPATIENT)
Dept: HEMATOLOGY/ONCOLOGY | Facility: CLINIC | Age: 73
End: 2023-10-13
Payer: MEDICARE

## 2023-11-03 ENCOUNTER — OFFICE VISIT (OUTPATIENT)
Dept: HEMATOLOGY/ONCOLOGY | Facility: CLINIC | Age: 73
End: 2023-11-03
Payer: MEDICARE

## 2023-11-03 VITALS
BODY MASS INDEX: 26.82 KG/M2 | TEMPERATURE: 97 F | HEART RATE: 62 BPM | OXYGEN SATURATION: 99 % | WEIGHT: 191.56 LBS | RESPIRATION RATE: 18 BRPM | HEIGHT: 71 IN | SYSTOLIC BLOOD PRESSURE: 134 MMHG | DIASTOLIC BLOOD PRESSURE: 72 MMHG

## 2023-11-03 DIAGNOSIS — C08.0: Primary | ICD-10-CM

## 2023-11-03 DIAGNOSIS — C44.311 BASAL CELL CARCINOMA (BCC) OF DORSUM OF NOSE: ICD-10-CM

## 2023-11-03 PROCEDURE — 1126F AMNT PAIN NOTED NONE PRSNT: CPT | Mod: CPTII,S$GLB,, | Performed by: OTOLARYNGOLOGY

## 2023-11-03 PROCEDURE — 3288F PR FALLS RISK ASSESSMENT DOCUMENTED: ICD-10-PCS | Mod: CPTII,S$GLB,, | Performed by: OTOLARYNGOLOGY

## 2023-11-03 PROCEDURE — 1160F PR REVIEW ALL MEDS BY PRESCRIBER/CLIN PHARMACIST DOCUMENTED: ICD-10-PCS | Mod: CPTII,S$GLB,, | Performed by: OTOLARYNGOLOGY

## 2023-11-03 PROCEDURE — 3288F FALL RISK ASSESSMENT DOCD: CPT | Mod: CPTII,S$GLB,, | Performed by: OTOLARYNGOLOGY

## 2023-11-03 PROCEDURE — 3078F DIAST BP <80 MM HG: CPT | Mod: CPTII,S$GLB,, | Performed by: OTOLARYNGOLOGY

## 2023-11-03 PROCEDURE — 1101F PR PT FALLS ASSESS DOC 0-1 FALLS W/OUT INJ PAST YR: ICD-10-PCS | Mod: CPTII,S$GLB,, | Performed by: OTOLARYNGOLOGY

## 2023-11-03 PROCEDURE — 99999 PR PBB SHADOW E&M-EST. PATIENT-LVL V: ICD-10-PCS | Mod: PBBFAC,,, | Performed by: OTOLARYNGOLOGY

## 2023-11-03 PROCEDURE — 3008F BODY MASS INDEX DOCD: CPT | Mod: CPTII,S$GLB,, | Performed by: OTOLARYNGOLOGY

## 2023-11-03 PROCEDURE — 1101F PT FALLS ASSESS-DOCD LE1/YR: CPT | Mod: CPTII,S$GLB,, | Performed by: OTOLARYNGOLOGY

## 2023-11-03 PROCEDURE — 1159F PR MEDICATION LIST DOCUMENTED IN MEDICAL RECORD: ICD-10-PCS | Mod: CPTII,S$GLB,, | Performed by: OTOLARYNGOLOGY

## 2023-11-03 PROCEDURE — 1126F PR PAIN SEVERITY QUANTIFIED, NO PAIN PRESENT: ICD-10-PCS | Mod: CPTII,S$GLB,, | Performed by: OTOLARYNGOLOGY

## 2023-11-03 PROCEDURE — 99999 PR PBB SHADOW E&M-EST. PATIENT-LVL V: CPT | Mod: PBBFAC,,, | Performed by: OTOLARYNGOLOGY

## 2023-11-03 PROCEDURE — 3078F PR MOST RECENT DIASTOLIC BLOOD PRESSURE < 80 MM HG: ICD-10-PCS | Mod: CPTII,S$GLB,, | Performed by: OTOLARYNGOLOGY

## 2023-11-03 PROCEDURE — 99214 OFFICE O/P EST MOD 30 MIN: CPT | Mod: S$GLB,,, | Performed by: OTOLARYNGOLOGY

## 2023-11-03 PROCEDURE — 99214 PR OFFICE/OUTPT VISIT, EST, LEVL IV, 30-39 MIN: ICD-10-PCS | Mod: S$GLB,,, | Performed by: OTOLARYNGOLOGY

## 2023-11-03 PROCEDURE — 1160F RVW MEDS BY RX/DR IN RCRD: CPT | Mod: CPTII,S$GLB,, | Performed by: OTOLARYNGOLOGY

## 2023-11-03 PROCEDURE — 3075F PR MOST RECENT SYSTOLIC BLOOD PRESS GE 130-139MM HG: ICD-10-PCS | Mod: CPTII,S$GLB,, | Performed by: OTOLARYNGOLOGY

## 2023-11-03 PROCEDURE — 3075F SYST BP GE 130 - 139MM HG: CPT | Mod: CPTII,S$GLB,, | Performed by: OTOLARYNGOLOGY

## 2023-11-03 PROCEDURE — 3008F PR BODY MASS INDEX (BMI) DOCUMENTED: ICD-10-PCS | Mod: CPTII,S$GLB,, | Performed by: OTOLARYNGOLOGY

## 2023-11-03 PROCEDURE — 1159F MED LIST DOCD IN RCRD: CPT | Mod: CPTII,S$GLB,, | Performed by: OTOLARYNGOLOGY

## 2023-11-03 RX ORDER — TRIAMCINOLONE ACETONIDE 1 MG/G
CREAM TOPICAL
COMMUNITY
Start: 2023-10-23

## 2023-11-03 NOTE — ASSESSMENT & PLAN NOTE
He thinks that it has resolved and still wants to defer surgery if possible. I honestly cannot appreciate a distinct mass on exam. Given his reticence and the clinical change, I will repeat a CT scan. SInce he has poor kidney function, a noncontrast MRI may actually be better. He is willing to get that done. Once this is done, we will reschedule his surgery around his schedule and his reticence. His partner inquired if we could wait until after the first of the year - I am not comfortable with waiting, but we will work with him.

## 2023-11-03 NOTE — ASSESSMENT & PLAN NOTE
This is a large lesion. We discussed wide excision with rapid margin assessment and staged reconstruction concurrent with the neck dissection. Even if the neck mass has resolved, we will need to schedule this in concert with Dr. Negron, Dr. Blackman, or Dr. Crawford.

## 2023-12-07 ENCOUNTER — HOSPITAL ENCOUNTER (OUTPATIENT)
Dept: RADIOLOGY | Facility: HOSPITAL | Age: 73
Discharge: HOME | End: 2023-12-07
Attending: OTOLARYNGOLOGY
Payer: MEDICARE

## 2023-12-07 DIAGNOSIS — C08.0: ICD-10-CM

## 2023-12-07 LAB
CREAT SERPL-MCNC: 4.3 MG/DL (ref 0.5–1.4)
SAMPLE: ABNORMAL

## 2023-12-07 PROCEDURE — 82565 ASSAY OF CREATININE: CPT | Mod: PO

## 2023-12-07 PROCEDURE — 70540 MRI ORBIT/FACE/NECK W/O DYE: CPT | Mod: TC,PO

## 2023-12-08 ENCOUNTER — TELEPHONE (OUTPATIENT)
Dept: HEMATOLOGY/ONCOLOGY | Facility: CLINIC | Age: 73
End: 2023-12-08
Payer: MEDICARE

## 2023-12-08 NOTE — TELEPHONE ENCOUNTER
Called to schedule pt appt with Dr Yanez to review test results, s/w pt's significant other, she will review appt dates with pt and call me back.

## 2023-12-08 NOTE — TELEPHONE ENCOUNTER
Pt is unable to make appt with Dr Yanez on 12/22/23, is able to make 12/29 at 1015.  Will check with Dr Yanez.

## 2023-12-29 ENCOUNTER — OFFICE VISIT (OUTPATIENT)
Dept: HEMATOLOGY/ONCOLOGY | Facility: CLINIC | Age: 73
End: 2023-12-29
Payer: MEDICARE

## 2023-12-29 VITALS
OXYGEN SATURATION: 97 % | HEART RATE: 63 BPM | WEIGHT: 188.06 LBS | TEMPERATURE: 97 F | BODY MASS INDEX: 26.33 KG/M2 | DIASTOLIC BLOOD PRESSURE: 64 MMHG | HEIGHT: 71 IN | SYSTOLIC BLOOD PRESSURE: 120 MMHG | RESPIRATION RATE: 18 BRPM

## 2023-12-29 DIAGNOSIS — C44.311 BASAL CELL CARCINOMA (BCC) OF DORSUM OF NOSE: ICD-10-CM

## 2023-12-29 DIAGNOSIS — C08.0: Primary | ICD-10-CM

## 2023-12-29 PROCEDURE — 1160F PR REVIEW ALL MEDS BY PRESCRIBER/CLIN PHARMACIST DOCUMENTED: ICD-10-PCS | Mod: CPTII,S$GLB,, | Performed by: OTOLARYNGOLOGY

## 2023-12-29 PROCEDURE — 3074F PR MOST RECENT SYSTOLIC BLOOD PRESSURE < 130 MM HG: ICD-10-PCS | Mod: CPTII,S$GLB,, | Performed by: OTOLARYNGOLOGY

## 2023-12-29 PROCEDURE — 99999 PR PBB SHADOW E&M-EST. PATIENT-LVL IV: CPT | Mod: PBBFAC,,, | Performed by: OTOLARYNGOLOGY

## 2023-12-29 PROCEDURE — 1126F PR PAIN SEVERITY QUANTIFIED, NO PAIN PRESENT: ICD-10-PCS | Mod: CPTII,S$GLB,, | Performed by: OTOLARYNGOLOGY

## 2023-12-29 PROCEDURE — 1160F RVW MEDS BY RX/DR IN RCRD: CPT | Mod: CPTII,S$GLB,, | Performed by: OTOLARYNGOLOGY

## 2023-12-29 PROCEDURE — 3008F PR BODY MASS INDEX (BMI) DOCUMENTED: ICD-10-PCS | Mod: CPTII,S$GLB,, | Performed by: OTOLARYNGOLOGY

## 2023-12-29 PROCEDURE — 1126F AMNT PAIN NOTED NONE PRSNT: CPT | Mod: CPTII,S$GLB,, | Performed by: OTOLARYNGOLOGY

## 2023-12-29 PROCEDURE — 1159F PR MEDICATION LIST DOCUMENTED IN MEDICAL RECORD: ICD-10-PCS | Mod: CPTII,S$GLB,, | Performed by: OTOLARYNGOLOGY

## 2023-12-29 PROCEDURE — 3078F DIAST BP <80 MM HG: CPT | Mod: CPTII,S$GLB,, | Performed by: OTOLARYNGOLOGY

## 2023-12-29 PROCEDURE — 3288F FALL RISK ASSESSMENT DOCD: CPT | Mod: CPTII,S$GLB,, | Performed by: OTOLARYNGOLOGY

## 2023-12-29 PROCEDURE — 99214 PR OFFICE/OUTPT VISIT, EST, LEVL IV, 30-39 MIN: ICD-10-PCS | Mod: S$GLB,,, | Performed by: OTOLARYNGOLOGY

## 2023-12-29 PROCEDURE — 3078F PR MOST RECENT DIASTOLIC BLOOD PRESSURE < 80 MM HG: ICD-10-PCS | Mod: CPTII,S$GLB,, | Performed by: OTOLARYNGOLOGY

## 2023-12-29 PROCEDURE — 99999 PR PBB SHADOW E&M-EST. PATIENT-LVL IV: ICD-10-PCS | Mod: PBBFAC,,, | Performed by: OTOLARYNGOLOGY

## 2023-12-29 PROCEDURE — 99214 OFFICE O/P EST MOD 30 MIN: CPT | Mod: S$GLB,,, | Performed by: OTOLARYNGOLOGY

## 2023-12-29 PROCEDURE — 1101F PR PT FALLS ASSESS DOC 0-1 FALLS W/OUT INJ PAST YR: ICD-10-PCS | Mod: CPTII,S$GLB,, | Performed by: OTOLARYNGOLOGY

## 2023-12-29 PROCEDURE — 3074F SYST BP LT 130 MM HG: CPT | Mod: CPTII,S$GLB,, | Performed by: OTOLARYNGOLOGY

## 2023-12-29 PROCEDURE — 1101F PT FALLS ASSESS-DOCD LE1/YR: CPT | Mod: CPTII,S$GLB,, | Performed by: OTOLARYNGOLOGY

## 2023-12-29 PROCEDURE — 1159F MED LIST DOCD IN RCRD: CPT | Mod: CPTII,S$GLB,, | Performed by: OTOLARYNGOLOGY

## 2023-12-29 PROCEDURE — 3008F BODY MASS INDEX DOCD: CPT | Mod: CPTII,S$GLB,, | Performed by: OTOLARYNGOLOGY

## 2023-12-29 PROCEDURE — 3288F PR FALLS RISK ASSESSMENT DOCUMENTED: ICD-10-PCS | Mod: CPTII,S$GLB,, | Performed by: OTOLARYNGOLOGY

## 2023-12-29 NOTE — ASSESSMENT & PLAN NOTE
"Although the FNA suggested a high grade neuroendocrine carcinoma, the behavior of the mass is not consistent with a high grade malignancy. Simply because it is smaller and both subjectively and objectively better. I tried to impress upon him that the diagnosis is what the diagnosis is, or at least what the FNA suggests, so we need to do something - I offered both the neck dissection as previously discussed and even a more focal operation targeting the gland itself so that we can get confirmation. He refused. He said that he was getting better, was more worried about his kidney function, and just wants to "keep an eye on it". I tried to explain to him and his friend that, if untreated, this will worsen and spread - and for that matter, the BCC on his nose will get bigger and may get to a point that the only way to treat it would be to remove his nose. We then talked about complex nasal reconstruction and prosthetics. In the end, he again refused surgical treatment of both.     With the improvement in the neck mass, I would recommend an excisional biopsy or at least repeat FNA before initiating any non-surgical therapy like radiation. But, with his reticence (actually, refusal) of surgery, perhaps radiation to the nasal BCC could be pursued. I will have him re-engage with Dr. Correa, Dr. Falcon, or Dr. Wang. But, as I told him, we cannot force him to have treatment if he does not want it, and he was clear today that he does not want anything done right now.   "

## 2023-12-29 NOTE — ASSESSMENT & PLAN NOTE
Please see above discussion.    I will reach out to Rad Onc. And he has agreed to return in 1-2 months to see Dr. Crawford because he does not want to come over to [place].

## 2023-12-29 NOTE — PROGRESS NOTES
HEAD [facility] NECK SURGICAL ONCOLOGY CLINIC    Subjective:       Patient ID: Hiren Rosa is a 73 y.o. male.    Chief Complaint: Follow-up (Malignant neoplasm submandibular gland)    HPI    Hiren Rosa is a 73 y.o. male who was previously referred by Dr. Correa for a left neck mass that was consistent with a high grade neuroendocrine carcinoma. We had planned surgery in the fall, but he cancelled because he needed to help his friend move. He had not wanted to reschedule until he came to clinic. He thinks that the neck mass has gotten smaller or gone away. He has been putting mometasone on the nasal lesion and thinks it is smaller as well. Otherwise, he is unchanged. He shares that he has had a nasal septal perforation since he was 13.     He had been referred to Dr. 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 Perdomo. He has a long history of sun exposure, with some bad sunburns as a kid - he used to work as a . He has a history of a prior SCC of the right temple that was managed with local therapy.     He denies dysphagia, odynophagia, throat pain, and otalgia. His voice has been hoarse for about a year - it has gotten worse since his CAB in December. There is no hemoptysis or hematemesis. He is breathing well. He reports that his kidney function continues to decline, and this worries him to the point that he does not want to do anything with his nose or neck mass.    He is a retired  from [employer], and he was in law enforcement.     Past Medical History:   Diagnosis Date    Disorder of kidney and ureter     GERD (gastroesophageal reflux disease)     Hypertension     Skin cancer     Thyroid disease        Past Surgical History:   Procedure Laterality Date    AORTIC VALVE REPLACEMENT      CORONARY ARTERY BYPASS GRAFT         Current Outpatient Medications:     apixaban (ELIQUIS) 2.5 mg Tab, Take by mouth 2 (two) times daily., Disp: ,  Rfl:     atorvastatin (LIPITOR) 40 MG tablet, Take 40 mg by mouth once daily., Disp: , Rfl:     carvediloL (COREG) 25 MG tablet, Take 25 mg by mouth 2 (two) times daily with meals., Disp: , Rfl:     cetirizine (ZYRTEC) 10 MG tablet, Take 10 mg by mouth 2 (two) times a day., Disp: , Rfl:     clindamycin (CLEOCIN T) 1 % lotion, SMARTSIG:gel Topical Twice Daily, Disp: , Rfl:     clobetasoL (TEMOVATE) 0.05 % cream, Apply topically 2 (two) times daily., Disp: , Rfl:     clopidogreL (PLAVIX) 75 mg tablet, Take 75 mg by mouth once daily., Disp: , Rfl:     digoxin (LANOXIN) 125 mcg tablet, Take 125 mcg by mouth once daily., Disp: , Rfl:     fenofibrate (TRICOR) 145 MG tablet, Take 145 mg by mouth., Disp: , Rfl:     iron, carbonyl, (ICAR) 15 mg/1.25 mL Susp, Take by mouth., Disp: , Rfl:     levothyroxine (SYNTHROID) 75 MCG tablet, Take 75 mcg by mouth before breakfast., Disp: , Rfl:     LORATADINE ORAL, Take 10 mg by mouth once daily., Disp: , Rfl:     midodrine (PROAMATINE) 2.5 MG Tab, Take 5 mg by mouth 3 (three) times daily., Disp: , Rfl:     pantoprazole (PROTONIX) 40 MG tablet, Take 40 mg by mouth once daily., Disp: , Rfl:     sodium bicarbonate 650 MG tablet, Take 650 mg by mouth 4 (four) times daily., Disp: , Rfl:     sucralfate (CARAFATE) 1 gram tablet, Take 1 g by mouth once daily., Disp: , Rfl:     triamcinolone acetonide 0.1% (KENALOG) 0.1 % cream, SMARTSI Application Topical 2-3 Times Daily, Disp: , Rfl:     TUMERIC-GING-OLIVE-OREG-CAPRYL ORAL, Take by mouth., Disp: , Rfl:     fenofibrate 160 MG Tab, Take 160 mg by mouth once daily., Disp: , Rfl:     nitroGLYCERIN (NITROSTAT) 0.4 MG SL tablet, Place 0.4 mg under the tongue., Disp: , Rfl:     Review of patient's allergies indicates:  No Known Allergies    Social History     Socioeconomic History    Marital status: Significant Other   Tobacco Use    Smoking status: Former     Types: Cigarettes    Smokeless tobacco: Never   Substance and Sexual Activity     Alcohol use: Not Currently     Alcohol/week: 1.0 standard drink of alcohol     Types: 1 Drinks containing 0.5 oz of alcohol per week    Drug use: Never       Family History   Problem Relation Age of Onset    Cancer Father     Cancer Brother        Review of Systems   Constitutional:  Negative for fatigue, fever and unexpected weight change.   HENT:  Negative for ear discharge, facial swelling, hearing loss, mouth sores, rhinorrhea, sore throat, tinnitus, trouble swallowing and voice change.    Eyes:  Negative for pain and visual disturbance.   Respiratory:  Negative for cough and shortness of breath.    Cardiovascular:  Negative for chest pain and palpitations.   Gastrointestinal:  Negative for abdominal pain, constipation and diarrhea.   Genitourinary:  Negative for difficulty urinating and dysuria.   Musculoskeletal:  Positive for arthralgias. Negative for back pain and neck pain.   Skin:  Positive for wound. Negative for color change and rash.        Skin lesion on dorsum as above   Neurological:  Negative for dizziness, seizures and headaches.   Hematological:  Positive for adenopathy. Does not bruise/bleed easily.   Psychiatric/Behavioral:  Negative for agitation. The patient is not nervous/anxious.        Objective:     Physical Exam  Vitals reviewed.   Constitutional:       Appearance: Normal appearance.   HENT:      Head: Normocephalic and atraumatic.      Comments:        Right Ear: Tympanic membrane, ear canal and external ear normal. No decreased hearing noted.      Left Ear: Tympanic membrane, ear canal and external ear normal. No decreased hearing noted.      Nose: Nose normal. No rhinorrhea.        Mouth/Throat:      Palate: No mass and lesions.   Eyes:      Extraocular Movements: Extraocular movements intact.      Conjunctiva/sclera: Conjunctivae normal.   Neck:        Comments: Salivary glands - there are no lesions or asymmetric findings in the submandibular or parotid glands  Pulmonary:       Effort: Pulmonary effort is normal. No respiratory distress.      Breath sounds: Normal breath sounds. No stridor.   Musculoskeletal:      Right shoulder: No deformity. Normal range of motion.      Left shoulder: No deformity. Normal range of motion.      Cervical back: Normal range of motion and neck supple.   Lymphadenopathy:      Cervical: No cervical adenopathy.      Left cervical: No deep cervical adenopathy.   Skin:     General: Skin is warm and dry.      Findings: No lesion.   Neurological:      Mental Status: He is alert and oriented to person, place, and time.      Cranial Nerves: No cranial nerve deficit or facial asymmetry.      Motor: No weakness.      Gait: Gait normal.                  Pathology Review   -[id number] - both biopsies of nasal dorsum are BCC    LYMPH NODE, LEFT SUBMANDIBULAR, NEEDLE CORE BIOPSY:   --POSITIVE FOR HIGH-GRADE NEUROENDOCRINE CARCINOMA (SEE COMMENT #1).     MRI from 12/7/23 reviewed with him and his friend:  IMPRESSION:  1. Lobular exophytic structure along the left submandibular gland, decreased in size from the previous exam, possibly from treatment change.  2. No pathologic lymphadenopathy by size.  3. Advanced paranasal sinus disease, 70 worse from the previous CT.  4. Additional and incidental findings as above.    Assessment & Plan:       Problem List Items Addressed This Visit       Basal cell carcinoma (BCC) of dorsum of nose     Please see above discussion.    I will reach out to Rad Onc. And he has agreed to return in 1-2 months to see Dr. Crawford because he does not want to come over to [place].          Malignant neoplasm submandibular gland - Primary     Although the FNA suggested a high grade neuroendocrine carcinoma, the behavior of the mass is not consistent with a high grade malignancy. Simply because it is smaller and both subjectively and objectively better. I tried to impress upon him that the diagnosis is what the diagnosis is, or at least what the FNA  "suggests, so we need to do something - I offered both the neck dissection as previously discussed and even a more focal operation targeting the gland itself so that we can get confirmation. He refused. He said that he was getting better, was more worried about his kidney function, and just wants to "keep an eye on it". I tried to explain to him and his friend that, if untreated, this will worsen and spread - and for that matter, the BCC on his nose will get bigger and may get to a point that the only way to treat it would be to remove his nose. We then talked about complex nasal reconstruction and prosthetics. In the end, he again refused surgical treatment of both.     With the improvement in the neck mass, I would recommend an excisional biopsy or at least repeat FNA before initiating any non-surgical therapy like radiation. But, with his reticence (actually, refusal) of surgery, perhaps radiation to the nasal BCC could be pursued. I will have him re-engage with Dr. Correa, Dr. Falcon, or Dr. Wang. But, as I told him, we cannot force him to have treatment if he does not want it, and he was clear today that he does not want anything done right now.                 "

## 2024-01-02 ENCOUNTER — TELEPHONE (OUTPATIENT)
Dept: HEMATOLOGY/ONCOLOGY | Facility: CLINIC | Age: 74
End: 2024-01-02
Payer: MEDICARE

## 2024-01-02 NOTE — TELEPHONE ENCOUNTER
Pt calling to reschedule his appt with Dr Wang, message sent to radiation. ----- Message from Natalie Swenson sent at 1/2/2024  1:40 PM CST -----  Type: Needs Medical Advice  Who Called:  Jane(caretaker)  Symptoms (please be specific):    How long has patient had these symptoms:    Pharmacy name and phone #:    Best Call Back Number: [phone]  Additional Information: Jane is requesting a call back from  regarding the patient.

## 2024-01-16 ENCOUNTER — TELEPHONE (OUTPATIENT)
Dept: RADIATION ONCOLOGY | Facility: CLINIC | Age: 74
End: 2024-01-16
Payer: MEDICARE

## 2024-01-16 NOTE — TELEPHONE ENCOUNTER
Pt's wife called back.  Pt doesn't want to see Dr. Wang for a Radiation Oncology appointment at this time.  If/when he decides to see Rad Onc he will call us.

## 2024-01-16 NOTE — TELEPHONE ENCOUNTER
Pt cancelled 2 appointments with Dr. Wang   Spoke with pt's wife.  She will discuss it with him and call back

## 2024-03-11 ENCOUNTER — TELEPHONE (OUTPATIENT)
Dept: HEMATOLOGY/ONCOLOGY | Facility: CLINIC | Age: 74
End: 2024-03-11
Payer: MEDICARE

## 2024-03-11 NOTE — TELEPHONE ENCOUNTER
Assisted pt's fiance rescheduling pt's appt with Dr Crawford.----- Message from Taylor Agarwal sent at 3/11/2024 12:36 PM CDT -----  Contact: Pt's fiance  Type: Needs Medical Advice    Who Called:  Patient's fiance for [facility]  What is this regarding?:  She needs to change his apt.  Best Call Back Number:  [phone], Ms. Jamel Rees   Additional Information:  Please call the patient's fiance back at the phone number listed above to advise. Thank you!

## 2024-03-27 ENCOUNTER — TELEPHONE (OUTPATIENT)
Dept: HEMATOLOGY/ONCOLOGY | Facility: CLINIC | Age: 74
End: 2024-03-27
Payer: MEDICARE

## 2024-03-27 NOTE — TELEPHONE ENCOUNTER
S/W pt's significant other, Jane. Pt was told yesterday that he needs to start on dialysis and does not want to come in for any additional appointments.  Emotional support provided.  Discussed that it would be helpful for pt to come in and have Dr Crawford provide information on possible disease progression and treatment alternatives.    Jane states pt does not want any appointments and will call us if he changes his mind.

## 2024-03-30 PROCEDURE — 30901 CONTROL OF NOSEBLEED: CPT | Mod: 50

## 2024-03-30 PROCEDURE — 99283 EMERGENCY DEPT VISIT LOW MDM: CPT | Mod: 25

## 2024-03-31 ENCOUNTER — HOSPITAL ENCOUNTER (EMERGENCY)
Facility: HOSPITAL | Age: 74
Discharge: HOME | End: 2024-03-31
Attending: EMERGENCY MEDICINE
Payer: MEDICARE

## 2024-03-31 VITALS
BODY MASS INDEX: 25.06 KG/M2 | HEIGHT: 72 IN | WEIGHT: 185 LBS | SYSTOLIC BLOOD PRESSURE: 144 MMHG | OXYGEN SATURATION: 98 % | TEMPERATURE: 98 F | HEART RATE: 72 BPM | RESPIRATION RATE: 18 BRPM | DIASTOLIC BLOOD PRESSURE: 77 MMHG

## 2024-03-31 DIAGNOSIS — R04.0 EPISTAXIS: Primary | ICD-10-CM

## 2024-03-31 LAB
ALBUMIN SERPL BCP-MCNC: 3.6 G/DL (ref 3.5–5.2)
ALP SERPL-CCNC: 68 U/L (ref 55–135)
ALT SERPL W/O P-5'-P-CCNC: 11 U/L (ref 10–44)
ANION GAP SERPL CALC-SCNC: 9 MMOL/L (ref 8–16)
AST SERPL-CCNC: 17 U/L (ref 10–40)
BASOPHILS # BLD AUTO: 0.02 K/UL (ref 0–0.2)
BASOPHILS NFR BLD: 0.3 % (ref 0–1.9)
BILIRUB SERPL-MCNC: 0.5 MG/DL (ref 0.1–1)
BUN SERPL-MCNC: 54 MG/DL (ref 8–23)
CALCIUM SERPL-MCNC: 10.8 MG/DL (ref 8.7–10.5)
CHLORIDE SERPL-SCNC: 114 MMOL/L (ref 95–110)
CO2 SERPL-SCNC: 19 MMOL/L (ref 23–29)
CREAT SERPL-MCNC: 4 MG/DL (ref 0.5–1.4)
DIFFERENTIAL METHOD BLD: ABNORMAL
EOSINOPHIL # BLD AUTO: 0.3 K/UL (ref 0–0.5)
EOSINOPHIL NFR BLD: 3.6 % (ref 0–8)
ERYTHROCYTE [DISTWIDTH] IN BLOOD BY AUTOMATED COUNT: 13.6 % (ref 11.5–14.5)
EST. GFR  (NO RACE VARIABLE): 15 ML/MIN/1.73 M^2
GLUCOSE SERPL-MCNC: 123 MG/DL (ref 70–110)
HCT VFR BLD AUTO: 33.8 % (ref 40–54)
HGB BLD-MCNC: 10.7 G/DL (ref 14–18)
IMM GRANULOCYTES # BLD AUTO: 0.02 K/UL (ref 0–0.04)
IMM GRANULOCYTES NFR BLD AUTO: 0.3 % (ref 0–0.5)
LYMPHOCYTES # BLD AUTO: 0.9 K/UL (ref 1–4.8)
LYMPHOCYTES NFR BLD: 12.5 % (ref 18–48)
MCH RBC QN AUTO: 34.1 PG (ref 27–31)
MCHC RBC AUTO-ENTMCNC: 31.7 G/DL (ref 32–36)
MCV RBC AUTO: 108 FL (ref 82–98)
MONOCYTES # BLD AUTO: 0.5 K/UL (ref 0.3–1)
MONOCYTES NFR BLD: 7.1 % (ref 4–15)
NEUTROPHILS # BLD AUTO: 5.6 K/UL (ref 1.8–7.7)
NEUTROPHILS NFR BLD: 76.2 % (ref 38–73)
NRBC BLD-RTO: 0 /100 WBC
PLATELET # BLD AUTO: 155 K/UL (ref 150–450)
PMV BLD AUTO: 9.9 FL (ref 9.2–12.9)
POTASSIUM SERPL-SCNC: 4.6 MMOL/L (ref 3.5–5.1)
PROT SERPL-MCNC: 7.4 G/DL (ref 6–8.4)
RBC # BLD AUTO: 3.14 M/UL (ref 4.6–6.2)
SODIUM SERPL-SCNC: 142 MMOL/L (ref 136–145)
WBC # BLD AUTO: 7.28 K/UL (ref 3.9–12.7)

## 2024-03-31 PROCEDURE — 30901 CONTROL OF NOSEBLEED: CPT | Mod: 50

## 2024-03-31 PROCEDURE — 25000003 PHARM REV CODE 250: Performed by: EMERGENCY MEDICINE

## 2024-03-31 PROCEDURE — 80053 COMPREHEN METABOLIC PANEL: CPT | Performed by: EMERGENCY MEDICINE

## 2024-03-31 PROCEDURE — 85025 COMPLETE CBC W/AUTO DIFF WBC: CPT | Performed by: EMERGENCY MEDICINE

## 2024-03-31 RX ORDER — ONDANSETRON HYDROCHLORIDE 2 MG/ML
4 INJECTION, SOLUTION INTRAVENOUS
Status: DISCONTINUED | OUTPATIENT
Start: 2024-03-31 | End: 2024-03-31 | Stop reason: HOSPADM

## 2024-03-31 RX ORDER — TRANEXAMIC ACID 100 MG/ML
1000 INJECTION, SOLUTION INTRAVENOUS ONCE
Status: COMPLETED | OUTPATIENT
Start: 2024-03-31 | End: 2024-03-31

## 2024-03-31 RX ORDER — OXYMETAZOLINE HCL 0.05 %
1 SPRAY, NON-AEROSOL (ML) NASAL
Status: COMPLETED | OUTPATIENT
Start: 2024-03-31 | End: 2024-03-31

## 2024-03-31 RX ADMIN — OXYMETAZOLINE HCL 1 SPRAY: 0.05 SPRAY NASAL at 12:03

## 2024-03-31 RX ADMIN — TRANEXAMIC ACID 1000 MG: 100 INJECTION, SOLUTION INTRAVENOUS at 01:03

## 2024-03-31 NOTE — ED PROVIDER NOTES
Encounter Date: 3/30/2024       History     Chief Complaint   Patient presents with    Epistaxis     Pt reports he was eating an onion around 1300 when he began having a nose bleed. Pt reports he has been able to stop the bleeding. Pt reports he takes eliquis. Pt reports that he had used tissues to try and pack the nares but they didn't help. Pt reports he has had this before and had to have his nose packed.   Nose clamp made and placed to hold pressure in triage.      Pleasant 74-year-old male presents with epistaxis from bilateral nares that started about 1:00 p.m. today.  Patient has had to have his nose packed previously but not recently.  He has seen an ENT before but does not remember where.  He is on anticoagulation.    The history is provided by the patient. No  was used.     Review of patient's allergies indicates:  No Known Allergies  Past Medical History:   Diagnosis Date    Disorder of kidney and ureter     GERD (gastroesophageal reflux disease)     Hypertension     Skin cancer     Thyroid disease      Past Surgical History:   Procedure Laterality Date    AORTIC VALVE REPLACEMENT      CORONARY ARTERY BYPASS GRAFT       Family History   Problem Relation Age of Onset    Cancer Father     Cancer Brother      Social History     Tobacco Use    Smoking status: Former     Types: Cigarettes    Smokeless tobacco: Never   Substance Use Topics    Alcohol use: Not Currently     Alcohol/week: 1.0 standard drink of alcohol     Types: 1 Drinks containing 0.5 oz of alcohol per week    Drug use: Never     Review of Systems   Constitutional:  Negative for fever.   HENT:  Positive for nosebleeds. Negative for sore throat.    Respiratory:  Negative for shortness of breath.    Cardiovascular:  Negative for chest pain.   Gastrointestinal:  Negative for nausea.   Genitourinary:  Negative for dysuria.   Musculoskeletal:  Negative for back pain.   Skin:  Negative for rash.   Neurological:  Negative for  weakness.   Hematological:  Does not bruise/bleed easily.   All other systems reviewed and are negative.      Physical Exam     Initial Vitals [03/30/24 2217]   BP Pulse Resp Temp SpO2   (!) 130/92 72 17 97.6 °F (36.4 °C) 98 %      MAP       --         Physical Exam    Nursing note and vitals reviewed.  Constitutional: He appears well-developed and well-nourished.   HENT:   Head: Normocephalic and atraumatic.   Epistaxis from the bilateral nares   Eyes: EOM are normal. Pupils are equal, round, and reactive to light.   Neck:   Normal range of motion.  Cardiovascular:  Normal rate and regular rhythm.           Pulmonary/Chest: Breath sounds normal. No respiratory distress.   Abdominal: Abdomen is soft.   Musculoskeletal:         General: Normal range of motion.      Cervical back: Normal range of motion.     Neurological: He is alert and oriented to person, place, and time. GCS score is 15. GCS eye subscore is 4. GCS verbal subscore is 5. GCS motor subscore is 6.   Skin: Skin is warm.         ED Course   Epistaxis Mgmt    Date/Time: 3/31/2024 5:13 AM    Performed by: Angy Bland MD  Authorized by: Angy Bland MD  Consent Done: Not Needed    Patient sedated: no  Treatment site: right anterior and left anterior  Repair method: anterior pack and Rhino Rocket  Post-procedure assessment: bleeding stopped  Treatment complexity: simple  Comments: Both nares anteriorly packed patient initially tolerated well and then started to feel lightheaded so packing was removed out of left naris no residual bleeding after packing was removed patient tolerated the packing remaining and right naris.        Labs Reviewed   CBC W/ AUTO DIFFERENTIAL - Abnormal; Notable for the following components:       Result Value    RBC 3.14 (*)     Hemoglobin 10.7 (*)     Hematocrit 33.8 (*)      (*)     MCH 34.1 (*)     MCHC 31.7 (*)     Lymph # 0.9 (*)     Gran % 76.2 (*)     Lymph % 12.5 (*)     All other components within normal  limits   COMPREHENSIVE METABOLIC PANEL - Abnormal; Notable for the following components:    Chloride 114 (*)     CO2 19 (*)     Glucose 123 (*)     BUN 54 (*)     Creatinine 4.0 (*)     Calcium 10.8 (*)     eGFR 15.0 (*)     All other components within normal limits          Imaging Results    None          Medications   ondansetron injection 4 mg (4 mg Intravenous Not Given 3/31/24 0245)   oxymetazoline 0.05 % nasal spray 1 spray (1 spray Each Nostril Given 3/31/24 0055)   tranexamic acid injection Soln 1,000 mg (1,000 mg Nasal Given 3/31/24 0139)     Medical Decision Making  Differential diagnosis includes anterior and posterior epistaxis.  Unable to clearly see source of bleeding due to large blood clots.  Attempted to suction and remove blood clots.  Unable.  Packed both nares an attempt to posteriorly dislodged clots which were effective.  Use TXA soaked rhino rockets.  Patient initially tolerated well however after about 25-30 minutes he started to feel lightheaded there was no drop in his blood pressure but I did remove the packing out of the left naris  Feels better after pulling left nare packing. Patient not bleeding currently.  Patient was observed for another hour without residual bleeding.  No blood in the posterior oropharynx.  Patient was tolerating p.o..  He will be referred to ENT.  He was told he can follow up with our ENT or with his previous ENT.  Patient was given return precautions and discharge.  Of note patient's labs were at his baseline creatinine and GFR.    Amount and/or Complexity of Data Reviewed  Labs: ordered. Decision-making details documented in ED Course.    Risk  OTC drugs.  Prescription drug management.                                      Clinical Impression:  Final diagnoses:  [R04.0] Epistaxis (Primary)          ED Disposition Condition    Discharge Stable          ED Prescriptions    None       Follow-up Information    None          Angy Bland MD  03/31/24 7927

## 2024-03-31 NOTE — ED NOTES
Reassessment of pt notes that nose continues to bleed, clamp remains in place. Gauze rolled and placed in edge of nares without removing clamp, gauze placed across nares and taped to help keep the pt from wiping at nose, gauze folded and placed under pt's top lip to help with pressure in attempt to stop bleeding. Pt awake, alert, and oriented, vs stable, no c/o pain or distress, resp even and unlabored on RA. Pt and spouse concerned about pt still having bleeding from nose.

## 2024-03-31 NOTE — DISCHARGE INSTRUCTIONS
A referral to ENT has been placed for you.  You will need to be evaluated by an ENT doctor in the next 48 hours.  This would be Monday or Tuesday at the very very latest.  If you can not get into see an ENT doctor please return to the emergency department for repeat evaluation.  You may follow up with any ENT you choose but I would recommend ENT at this hospital as it should be easier to get into see them.

## 2024-03-31 NOTE — ED NOTES
D/C to home. Instructions provided and referral given to follow up with ENT within next 48hrs. Verbalized to return if unable to get evaluated and treated within next 48 hr to return to ER for packing removal and evaluation. Voices understanding of treatment plan. Packing air valve secured to cheek with tape and extra tape sent with patient to use if becomes loose. Voices understanding. Escorted to lobby to complete d/c process.

## 2024-03-31 NOTE — ED NOTES
Packing remains in place to right nostril. No bleeding noted to right nostril. Oropharynx examined and no active bleeding noted. Patient denies post nasal drainage. Left nostril evaluated and all bleeding has subsided. MD at bedside. Patient states he feels fine and would like to try and go home.

## 2024-03-31 NOTE — ED NOTES
Patient remains with bilateral nasal packing. States he does not feel any post nasal drip or bleeding. Oral pharynx visualized and no active bleeding noted. Vitally stable. Will continue to monitor patient.

## 2024-04-01 ENCOUNTER — HOSPITAL ENCOUNTER (EMERGENCY)
Facility: HOSPITAL | Age: 74
Discharge: HOME | End: 2024-04-01
Attending: STUDENT IN AN ORGANIZED HEALTH CARE EDUCATION/TRAINING PROGRAM
Payer: MEDICARE

## 2024-04-01 VITALS
OXYGEN SATURATION: 100 % | RESPIRATION RATE: 16 BRPM | BODY MASS INDEX: 25.06 KG/M2 | TEMPERATURE: 98 F | HEIGHT: 72 IN | WEIGHT: 185 LBS | SYSTOLIC BLOOD PRESSURE: 113 MMHG | HEART RATE: 67 BPM | DIASTOLIC BLOOD PRESSURE: 64 MMHG

## 2024-04-01 DIAGNOSIS — Z48.00 ENCOUNTER FOR REMOVAL OF NASAL PACKING: Primary | ICD-10-CM

## 2024-04-01 PROCEDURE — 99281 EMR DPT VST MAYX REQ PHY/QHP: CPT

## 2024-04-01 NOTE — ED TRIAGE NOTES
Patient ambulatory to triage with steady gait unassisted. Patient reports he is here to have the packing removed out of the left nare that was placed Saturday night by Dr. Bland. Patient reports he has a follow up appt tomorrow morning at 845am with ENT. Respirations even and unlabored. No distress noted. GCS 15.

## 2024-04-01 NOTE — FIRST PROVIDER EVALUATION
Emergency Department TeleTriage Encounter Note      CHIEF COMPLAINT    Chief Complaint   Patient presents with    Foreign Body in Nose     Patient reports he was told to return to the ED today to have the packing removed from his right nare that was placed Saturday night by Dr. Bland.        VITAL SIGNS   Initial Vitals [04/01/24 0945]   BP Pulse Resp Temp SpO2   113/64 67 16 97.5 °F (36.4 °C) 100 %      MAP       --            ALLERGIES    Review of patient's allergies indicates:  No Known Allergies    PROVIDER TRIAGE NOTE  Verbal consent for the teletriage evaluation was given by the patient at the start of the evaluation.  All efforts will be made to maintain patient's privacy during the evaluation.      This is a teletriage evaluation of a 74 y.o. male presenting to the ED with c/o need rhino-rocket removed from right nare that was placed this past Saturday night.  Instructed to return today for removal. Limited physical exam via telehealth: The patient is awake, alert, answering questions appropriately and is not in respiratory distress.  As the Teletriage provider, I performed an initial assessment and ordered appropriate labs and imaging studies, if any, to facilitate the patient's care once placed in the ED. Once a room is available, care and a full evaluation will be completed by an alternate ED provider.  Any additional orders and the final disposition will be determined by that provider.  All imaging and labs will not be followed-up by the Teletriage Team, including myself.          ORDERS  Labs Reviewed - No data to display    ED Orders (720h ago, onward)      None              Virtual Visit Note: The provider triage portion of this emergency department evaluation and documentation was performed via "[employer] a HIPAA-compliant telemedicine application, in concert with a tele-presenter in the room. A face to face patient evaluation with one of my colleagues will occur once the patient is placed in  an emergency department room.      DISCLAIMER: This note was prepared with [employer] voice recognition transcription software. Garbled syntax, mangled pronouns, and other bizarre constructions may be attributed to that software system.

## 2024-04-01 NOTE — ED NOTES
Patient back to waiting room to wait for available bed. Informed patient of wait time and no bed availability at this time. Patient verbalized understanding.

## 2024-04-02 NOTE — ED PROVIDER NOTES
CHIEF COMPLAINT  Chief Complaint   Patient presents with    Foreign Body in Nose     Patient reports he was told to return to the ED today to have the packing removed from his right nare that was placed Saturday night by Dr. Bland.        HISTORY OF PRESENT ILLNESS  Hiren Rosa is a 74 y.o. male  presenting with need to remove nose packing. It was placed yesterday. No active bleeding noted, he was advised to come to ER for packing removal. He had Rhino Rocket  in right naris. No other specific aggravating or relieving factors otherwise.      PAST MEDICAL HISTORY  Past Medical History:   Diagnosis Date    Disorder of kidney and ureter     GERD (gastroesophageal reflux disease)     Hypertension     Skin cancer     Thyroid disease        CURRENT MEDICATIONS    No current facility-administered medications for this encounter.    Current Outpatient Medications:     apixaban (ELIQUIS) 2.5 mg Tab, Take by mouth 2 (two) times daily., Disp: , Rfl:     atorvastatin (LIPITOR) 40 MG tablet, Take 40 mg by mouth once daily., Disp: , Rfl:     carvediloL (COREG) 25 MG tablet, Take 25 mg by mouth 2 (two) times daily with meals., Disp: , Rfl:     cetirizine (ZYRTEC) 10 MG tablet, Take 10 mg by mouth 2 (two) times a day., Disp: , Rfl:     clindamycin (CLEOCIN T) 1 % lotion, SMARTSIG:gel Topical Twice Daily, Disp: , Rfl:     clobetasoL (TEMOVATE) 0.05 % cream, Apply topically 2 (two) times daily., Disp: , Rfl:     clopidogreL (PLAVIX) 75 mg tablet, Take 75 mg by mouth once daily., Disp: , Rfl:     digoxin (LANOXIN) 125 mcg tablet, Take 125 mcg by mouth once daily., Disp: , Rfl:     fenofibrate (TRICOR) 145 MG tablet, Take 145 mg by mouth., Disp: , Rfl:     fenofibrate 160 MG Tab, Take 160 mg by mouth once daily., Disp: , Rfl:     iron, carbonyl, (ICAR) 15 mg/1.25 mL Susp, Take by mouth., Disp: , Rfl:     levothyroxine (SYNTHROID) 75 MCG tablet, Take 75 mcg by mouth before breakfast., Disp: , Rfl:     LORATADINE ORAL, Take 10 mg by  mouth once daily., Disp: , Rfl:     midodrine (PROAMATINE) 2.5 MG Tab, Take 5 mg by mouth 3 (three) times daily., Disp: , Rfl:     nitroGLYCERIN (NITROSTAT) 0.4 MG SL tablet, Place 0.4 mg under the tongue., Disp: , Rfl:     pantoprazole (PROTONIX) 40 MG tablet, Take 40 mg by mouth once daily., Disp: , Rfl:     sodium bicarbonate 650 MG tablet, Take 650 mg by mouth 4 (four) times daily., Disp: , Rfl:     sucralfate (CARAFATE) 1 gram tablet, Take 1 g by mouth once daily., Disp: , Rfl:     triamcinolone acetonide 0.1% (KENALOG) 0.1 % cream, SMARTSI Application Topical 2-3 Times Daily, Disp: , Rfl:     TUMERIC-GING-OLIVE-OREG-CAPRYL ORAL, Take by mouth., Disp: , Rfl:     ALLERGIES    Review of patient's allergies indicates:  No Known Allergies    SURGICAL HISTORY    Past Surgical History:   Procedure Laterality Date    AORTIC VALVE REPLACEMENT      CORONARY ARTERY BYPASS GRAFT         SOCIAL HISTORY    Social History     Socioeconomic History    Marital status: Significant Other   Tobacco Use    Smoking status: Former     Types: Cigarettes    Smokeless tobacco: Never   Substance and Sexual Activity    Alcohol use: Not Currently     Alcohol/week: 1.0 standard drink of alcohol     Types: 1 Drinks containing 0.5 oz of alcohol per week    Drug use: Never       FAMILY HISTORY    Family History   Problem Relation Age of Onset    Cancer Father     Cancer Brother        REVIEW OF SYSTEMS    Review of Systems   HENT:          Rhino Rocket  in right naris.      All other systems reviewed and are negative    VITAL SIGNS:   /64 (BP Location: Left arm, Patient Position: Sitting)   Pulse 67   Temp 97.5 °F (36.4 °C) (Oral)   Resp 16   Ht 6' (1.829 m)   Wt 83.9 kg (185 lb)   SpO2 100%   BMI 25.09 kg/m²      Physical Exam  Constitutional:       Appearance: He is normal weight.   HENT:      Head: Normocephalic.      Nose:      Right Nostril: No foreign body, epistaxis or septal hematoma.   Cardiovascular:      Rate and  Rhythm: Normal rate.   Pulmonary:      Effort: Pulmonary effort is normal.   Skin:     Capillary Refill: Capillary refill takes less than 2 seconds.   Neurological:      General: No focal deficit present.      Mental Status: He is alert.   Psychiatric:         Mood and Affect: Mood normal.       Vitals and nursing note reviewed.     LABS    Labs Reviewed - No data to display      EKG    No results found for this or any previous visit.      RADIOLOGY    No orders to display         PROCEDURES    Procedures    Medications - No data to display             Medical Decision Making  Hiren Rosa is a 74 y.o. male  presenting with need to remove nose packing. It was placed yesterday. No active bleeding noted, he was advised to come to ER for packing removal. He had Rhino Rocket  in right naris. No other specific aggravating or relieving factors otherwise.    DX:  Chemical irritants, hepatic failure, leukemia, thrombocytopenia, trauma  Removed rhino rocket packing without problem, smear amount of bleeding noted , patient was monitored 1 hour, no more bleeding noted.   Disposition: discharged home with Referral to DR. Mendez.     Problems Addressed:  Encounter for removal of nasal packing: acute illness or injury    Amount and/or Complexity of Data Reviewed  Discussion of management or test interpretation with external provider(s): DR. Mendez, ok to follow up with him    Risk  Minor surgery with identified risk factors.           Discharge Medication List as of 4/1/2024 11:27 AM          Discharge Medication List as of 4/1/2024 11:27 AM            DISPOSITION  Patient discharged to home in stable condition.        FINAL IMPRESSION    1. Encounter for removal of nasal packing         Jaylin Aranda NP  04/01/24 2053

## 2024-08-17 ENCOUNTER — HOSPITAL ENCOUNTER (EMERGENCY)
Facility: HOSPITAL | Age: 74
Discharge: ANOTHER HOSPITAL | End: 2024-08-17
Attending: EMERGENCY MEDICINE
Payer: MEDICARE

## 2024-08-17 VITALS
SYSTOLIC BLOOD PRESSURE: 141 MMHG | TEMPERATURE: 98 F | BODY MASS INDEX: 28 KG/M2 | OXYGEN SATURATION: 100 % | WEIGHT: 200 LBS | HEART RATE: 136 BPM | DIASTOLIC BLOOD PRESSURE: 90 MMHG | RESPIRATION RATE: 22 BRPM | HEIGHT: 71 IN

## 2024-08-17 DIAGNOSIS — D62 ACUTE BLOOD LOSS ANEMIA: ICD-10-CM

## 2024-08-17 DIAGNOSIS — K62.5 RECTAL BLEEDING: Primary | ICD-10-CM

## 2024-08-17 DIAGNOSIS — R53.1 WEAKNESS: ICD-10-CM

## 2024-08-17 DIAGNOSIS — R04.0 EPISTAXIS: ICD-10-CM

## 2024-08-17 PROBLEM — K92.1 MELENA: Status: ACTIVE | Noted: 2024-08-17

## 2024-08-17 PROBLEM — Z99.2 ESRD ON PERITONEAL DIALYSIS: Status: ACTIVE | Noted: 2024-08-17

## 2024-08-17 PROBLEM — R79.89 ELEVATED TROPONIN: Status: ACTIVE | Noted: 2024-08-17

## 2024-08-17 PROBLEM — N18.6 ESRD ON PERITONEAL DIALYSIS: Status: ACTIVE | Noted: 2024-08-17

## 2024-08-17 PROBLEM — I50.22 CHRONIC HFREF (HEART FAILURE WITH REDUCED EJECTION FRACTION): Status: ACTIVE | Noted: 2024-08-17

## 2024-08-17 LAB
ABO + RH BLD: NORMAL
ALBUMIN SERPL BCP-MCNC: 2.3 G/DL (ref 3.5–5.2)
ALP SERPL-CCNC: 41 U/L (ref 55–135)
ALT SERPL W/O P-5'-P-CCNC: 9 U/L (ref 10–44)
ANION GAP SERPL CALC-SCNC: 6 MMOL/L (ref 8–16)
AST SERPL-CCNC: 23 U/L (ref 10–40)
BASOPHILS # BLD AUTO: 0.01 K/UL (ref 0–0.2)
BASOPHILS NFR BLD: 0.2 % (ref 0–1.9)
BILIRUB SERPL-MCNC: 0.9 MG/DL (ref 0.1–1)
BLD GP AB SCN CELLS X3 SERPL QL: NORMAL
BLD PROD TYP BPU: NORMAL
BLD PROD TYP BPU: NORMAL
BLOOD UNIT EXPIRATION DATE: NORMAL
BLOOD UNIT EXPIRATION DATE: NORMAL
BLOOD UNIT TYPE CODE: 5100
BLOOD UNIT TYPE CODE: 9500
BLOOD UNIT TYPE: NORMAL
BLOOD UNIT TYPE: NORMAL
BUN SERPL-MCNC: 39 MG/DL (ref 8–23)
CALCIUM SERPL-MCNC: 9.1 MG/DL (ref 8.7–10.5)
CHLORIDE SERPL-SCNC: 118 MMOL/L (ref 95–110)
CO2 SERPL-SCNC: 20 MMOL/L (ref 23–29)
CODING SYSTEM: NORMAL
CODING SYSTEM: NORMAL
CREAT SERPL-MCNC: 4 MG/DL (ref 0.5–1.4)
CROSSMATCH INTERPRETATION: NORMAL
CROSSMATCH INTERPRETATION: NORMAL
DIFFERENTIAL METHOD BLD: ABNORMAL
DISPENSE STATUS: NORMAL
DISPENSE STATUS: NORMAL
EOSINOPHIL # BLD AUTO: 0.1 K/UL (ref 0–0.5)
EOSINOPHIL NFR BLD: 1.6 % (ref 0–8)
ERYTHROCYTE [DISTWIDTH] IN BLOOD BY AUTOMATED COUNT: 18.4 % (ref 11.5–14.5)
EST. GFR  (NO RACE VARIABLE): 15 ML/MIN/1.73 M^2
GLUCOSE SERPL-MCNC: 127 MG/DL (ref 70–110)
HCT VFR BLD AUTO: 19.6 % (ref 40–54)
HGB BLD-MCNC: 6.1 G/DL (ref 14–18)
HGB BLD-MCNC: 7.7 G/DL (ref 14–18)
IMM GRANULOCYTES # BLD AUTO: 0.01 K/UL (ref 0–0.04)
IMM GRANULOCYTES NFR BLD AUTO: 0.2 % (ref 0–0.5)
LIPASE SERPL-CCNC: 52 U/L (ref 4–60)
LYMPHOCYTES # BLD AUTO: 0.6 K/UL (ref 1–4.8)
LYMPHOCYTES NFR BLD: 12.6 % (ref 18–48)
MCH RBC QN AUTO: 35.7 PG (ref 27–31)
MCHC RBC AUTO-ENTMCNC: 31.1 G/DL (ref 32–36)
MCV RBC AUTO: 115 FL (ref 82–98)
MONOCYTES # BLD AUTO: 0.4 K/UL (ref 0.3–1)
MONOCYTES NFR BLD: 8.5 % (ref 4–15)
NEUTROPHILS # BLD AUTO: 3.4 K/UL (ref 1.8–7.7)
NEUTROPHILS NFR BLD: 76.9 % (ref 38–73)
NRBC BLD-RTO: 0 /100 WBC
NUM UNITS TRANS PACKED RBC: NORMAL
NUM UNITS TRANS PACKED RBC: NORMAL
PLATELET # BLD AUTO: 101 K/UL (ref 150–450)
PMV BLD AUTO: 11.1 FL (ref 9.2–12.9)
POTASSIUM SERPL-SCNC: 4.6 MMOL/L (ref 3.5–5.1)
PROT SERPL-MCNC: 4.4 G/DL (ref 6–8.4)
RBC # BLD AUTO: 1.71 M/UL (ref 4.6–6.2)
SODIUM SERPL-SCNC: 144 MMOL/L (ref 136–145)
SPECIMEN OUTDATE: NORMAL
TROPONIN I SERPL DL<=0.01 NG/ML-MCNC: 0.01 NG/ML (ref 0–0.03)
WBC # BLD AUTO: 4.36 K/UL (ref 3.9–12.7)

## 2024-08-17 PROCEDURE — 86900 BLOOD TYPING SEROLOGIC ABO: CPT | Mod: 91 | Performed by: EMERGENCY MEDICINE

## 2024-08-17 PROCEDURE — 63600175 PHARM REV CODE 636 W HCPCS: Performed by: EMERGENCY MEDICINE

## 2024-08-17 PROCEDURE — 80053 COMPREHEN METABOLIC PANEL: CPT | Performed by: EMERGENCY MEDICINE

## 2024-08-17 PROCEDURE — 94760 N-INVAS EAR/PLS OXIMETRY 1: CPT

## 2024-08-17 PROCEDURE — 85025 COMPLETE CBC W/AUTO DIFF WBC: CPT | Performed by: EMERGENCY MEDICINE

## 2024-08-17 PROCEDURE — 99285 EMERGENCY DEPT VISIT HI MDM: CPT | Mod: 25

## 2024-08-17 PROCEDURE — 93005 ELECTROCARDIOGRAM TRACING: CPT

## 2024-08-17 PROCEDURE — 25000003 PHARM REV CODE 250: Performed by: EMERGENCY MEDICINE

## 2024-08-17 PROCEDURE — 83690 ASSAY OF LIPASE: CPT | Performed by: EMERGENCY MEDICINE

## 2024-08-17 PROCEDURE — 85018 HEMOGLOBIN: CPT | Performed by: EMERGENCY MEDICINE

## 2024-08-17 PROCEDURE — 36415 COLL VENOUS BLD VENIPUNCTURE: CPT | Performed by: EMERGENCY MEDICINE

## 2024-08-17 PROCEDURE — 36430 TRANSFUSION BLD/BLD COMPNT: CPT | Mod: 59

## 2024-08-17 PROCEDURE — P9016 RBC LEUKOCYTES REDUCED: HCPCS | Performed by: EMERGENCY MEDICINE

## 2024-08-17 PROCEDURE — 86920 COMPATIBILITY TEST SPIN: CPT | Mod: 59 | Performed by: EMERGENCY MEDICINE

## 2024-08-17 PROCEDURE — 86850 RBC ANTIBODY SCREEN: CPT | Performed by: EMERGENCY MEDICINE

## 2024-08-17 PROCEDURE — 84484 ASSAY OF TROPONIN QUANT: CPT | Performed by: EMERGENCY MEDICINE

## 2024-08-17 PROCEDURE — 93010 ELECTROCARDIOGRAM REPORT: CPT | Mod: ,,, | Performed by: INTERNAL MEDICINE

## 2024-08-17 PROCEDURE — 96374 THER/PROPH/DIAG INJ IV PUSH: CPT | Mod: 59

## 2024-08-17 PROCEDURE — 30901 CONTROL OF NOSEBLEED: CPT | Mod: RT

## 2024-08-17 PROCEDURE — 86901 BLOOD TYPING SEROLOGIC RH(D): CPT | Mod: 91 | Performed by: EMERGENCY MEDICINE

## 2024-08-17 RX ORDER — HYDROCODONE BITARTRATE AND ACETAMINOPHEN 5; 325 MG/1; MG/1
1 TABLET ORAL EVERY 6 HOURS PRN
COMMUNITY
Start: 2024-07-23 | End: 2024-08-17 | Stop reason: CLARIF

## 2024-08-17 RX ORDER — ONDANSETRON 4 MG/1
4 TABLET, FILM COATED ORAL EVERY 8 HOURS PRN
COMMUNITY
Start: 2024-07-23

## 2024-08-17 RX ORDER — MIDODRINE HYDROCHLORIDE 2.5 MG/1
2.5 TABLET ORAL ONCE
Status: COMPLETED | OUTPATIENT
Start: 2024-08-17 | End: 2024-08-17

## 2024-08-17 RX ORDER — MOMETASONE FUROATE 1 MG/G
1 CREAM TOPICAL
COMMUNITY
Start: 2024-05-08

## 2024-08-17 RX ORDER — PANTOPRAZOLE SODIUM 40 MG/10ML
80 INJECTION, POWDER, LYOPHILIZED, FOR SOLUTION INTRAVENOUS
Status: COMPLETED | OUTPATIENT
Start: 2024-08-17 | End: 2024-08-17

## 2024-08-17 RX ORDER — HEPARIN SODIUM 5000 [USP'U]/ML
INJECTION, SOLUTION INTRAVENOUS; SUBCUTANEOUS
COMMUNITY
Start: 2024-06-25 | End: 2024-08-17 | Stop reason: CLARIF

## 2024-08-17 RX ORDER — HYDROCODONE BITARTRATE AND ACETAMINOPHEN 500; 5 MG/1; MG/1
TABLET ORAL
Status: DISCONTINUED | OUTPATIENT
Start: 2024-08-17 | End: 2024-08-17 | Stop reason: HOSPADM

## 2024-08-17 RX ORDER — ENOXAPARIN SODIUM 150 MG/ML
90 INJECTION SUBCUTANEOUS
COMMUNITY
Start: 2024-07-19 | End: 2024-08-17 | Stop reason: CLARIF

## 2024-08-17 RX ORDER — ALUMINUM HYDROXIDE, MAGNESIUM HYDROXIDE, AND SIMETHICONE 1200; 120; 1200 MG/30ML; MG/30ML; MG/30ML
30 SUSPENSION ORAL
Status: DISCONTINUED | OUTPATIENT
Start: 2024-08-17 | End: 2024-08-17

## 2024-08-17 RX ORDER — IRON,CARB/VIT C/VIT B12/FOLIC 100-250-1
1 TABLET ORAL 2 TIMES DAILY
COMMUNITY
Start: 2024-04-22

## 2024-08-17 RX ORDER — OXYMETAZOLINE HCL 0.05 %
1 SPRAY, NON-AEROSOL (ML) NASAL
Status: COMPLETED | OUTPATIENT
Start: 2024-08-17 | End: 2024-08-17

## 2024-08-17 RX ORDER — IRON,CARBONYL/ASCORBIC ACID 100-250 MG
1 TABLET ORAL 2 TIMES DAILY
COMMUNITY
Start: 2024-05-06

## 2024-08-17 RX ORDER — SUCRALFATE 1 G/10ML
1 SUSPENSION ORAL EVERY 6 HOURS
Status: DISCONTINUED | OUTPATIENT
Start: 2024-08-17 | End: 2024-08-17

## 2024-08-17 RX ORDER — MUPIROCIN 20 MG/G
OINTMENT TOPICAL 3 TIMES DAILY
COMMUNITY
Start: 2024-06-10 | End: 2024-08-17 | Stop reason: CLARIF

## 2024-08-17 RX ADMIN — PANTOPRAZOLE SODIUM 80 MG: 40 INJECTION, POWDER, FOR SOLUTION INTRAVENOUS at 10:08

## 2024-08-17 RX ADMIN — MIDODRINE HYDROCHLORIDE 2.5 MG: 2.5 TABLET ORAL at 05:08

## 2024-08-17 RX ADMIN — SODIUM CHLORIDE 250 ML: 9 INJECTION, SOLUTION INTRAVENOUS at 11:08

## 2024-08-17 RX ADMIN — OXYMETAZOLINE HCL 1 SPRAY: 0.05 SPRAY NASAL at 10:08

## 2024-08-17 NOTE — ED PROVIDER NOTES
Encounter Date: 8/17/2024       History     Chief Complaint   Patient presents with    Rectal Bleeding     Pt reports rectal bleeding and nose bleed that started last night. Takes Eliquis. +Pallor.     74-year-old male with past history of hypertension, chronic kidney disease/end-stage renal disease on peritoneal dialysis on Eliquis previous history of nosebleeds states that he has had a nosebleed since last night and then he started to have some bleeding in his rectum when she states she has had before when he gets nosebleeds.  Denies any weakness, abdominal pain, shortness of breath or chest pain.  He has been utilizing his home dialysis as scheduled per him.  He last took Eliquis yesterday evening.  No other exacerbating or relieving factors identified.      Review of patient's allergies indicates:  No Known Allergies  Past Medical History:   Diagnosis Date    Disorder of kidney and ureter     GERD (gastroesophageal reflux disease)     Hypertension     Skin cancer     Thyroid disease      Past Surgical History:   Procedure Laterality Date    AORTIC VALVE REPLACEMENT      CORONARY ARTERY BYPASS GRAFT       Family History   Problem Relation Name Age of Onset    Cancer Father      Cancer Brother       Social History     Tobacco Use    Smoking status: Former     Types: Cigarettes    Smokeless tobacco: Never   Substance Use Topics    Alcohol use: Not Currently     Alcohol/week: 1.0 standard drink of alcohol     Types: 1 Drinks containing 0.5 oz of alcohol per week    Drug use: Never     Review of Systems   Constitutional: Negative.    HENT:  Positive for nosebleeds.    Eyes: Negative.    Respiratory: Negative.     Cardiovascular: Negative.    Gastrointestinal: Negative.    Endocrine: Negative.    Genitourinary: Negative.    Musculoskeletal: Negative.    Skin: Negative.    Allergic/Immunologic: Negative.    Neurological: Negative.    Hematological: Negative.    Psychiatric/Behavioral: Negative.     All other systems  reviewed and are negative.      Physical Exam     Initial Vitals [08/17/24 1046]   BP Pulse Resp Temp SpO2   (!) 84/49 91 18 97.6 °F (36.4 °C) 100 %      MAP       --         Physical Exam    Constitutional:   Chronically ill-appearing patient in no obvious distress   HENT:   Dried blood bilateral nares.  Mild trickling of dark red blood down posterior oropharynx   Eyes: EOM are normal. Pupils are equal, round, and reactive to light.   Pale conjunctiva   Abdominal: Abdomen is soft. Bowel sounds are normal. He exhibits no distension. There is no abdominal tenderness.   Peritoneal dialysis catheter left mid abdomen clean, dry, intact, clear fluid; rectal exam no external hemorrhoids.  Dark red blood on rectal exam.   Musculoskeletal:         General: Normal range of motion.     Neurological: He is alert and oriented to person, place, and time. He has normal strength.         ED Course   Critical Care    Date/Time: 8/17/2024 4:46 PM    Performed by: Ric Saxena MD  Authorized by: Ric Saxena MD  Direct patient critical care time: 20 minutes  Additional history critical care time: 5 minutes  Ordering / reviewing critical care time: 5 minutes  Documentation critical care time: 5 minutes  Total critical care time (exclusive of procedural time) : 35 minutes        Labs Reviewed   CBC W/ AUTO DIFFERENTIAL - Abnormal       Result Value    WBC 4.36      RBC 1.71 (*)     Hemoglobin 6.1 (*)     Hematocrit 19.6 (*)      (*)     MCH 35.7 (*)     MCHC 31.1 (*)     RDW 18.4 (*)     Platelets 101 (*)     MPV 11.1      Immature Granulocytes 0.2      Gran # (ANC) 3.4      Immature Grans (Abs) 0.01      Lymph # 0.6 (*)     Mono # 0.4      Eos # 0.1      Baso # 0.01      nRBC 0      Gran % 76.9 (*)     Lymph % 12.6 (*)     Mono % 8.5      Eosinophil % 1.6      Basophil % 0.2      Differential Method Automated      Narrative:      Hematocrit  critical result(s) called and verbal readback obtained   from   Home  [facility] 08/17/2024 11:16   COMPREHENSIVE METABOLIC PANEL - Abnormal    Sodium 144      Potassium 4.6      Chloride 118 (*)     CO2 20 (*)     Glucose 127 (*)     BUN 39 (*)     Creatinine 4.0 (*)     Calcium 9.1      Total Protein 4.4 (*)     Albumin 2.3 (*)     Total Bilirubin 0.9      Alkaline Phosphatase 41 (*)     AST 23      ALT 9 (*)     eGFR 15.0 (*)     Anion Gap 6 (*)    LIPASE    Lipase 52     TROPONIN I    Troponin I 0.015     TYPE & SCREEN    Group & Rh O POS      Indirect Marco NEG      Specimen Outdate 08/20/2024 23:59     TYPE & SCREEN   PREPARE RBC SOFT    UNIT NUMBER [id number]      Product Code W4218A37      DISPENSE STATUS ISSUED      CODING SYSTEM IVAL504      Unit Blood Type Code 9500      Unit Blood Type O NEG      Unit Expiration 202409052359      CROSSMATCH INTERPRETATION Compatible      UNIT NUMBER [id number]      Product Code T1832C42      DISPENSE STATUS CROSSMATCHED      CODING SYSTEM YOKX452      Unit Blood Type Code 5100      Unit Blood Type O POS      Unit Expiration [id number]      CROSSMATCH INTERPRETATION Compatible     PREPARE RBC SOFT     EKG Readings: (Independently Interpreted)   1045; a flutter 97 beats per minute left axis deviation, nonspecific ST changes occasional PVCs.       Imaging Results    None          Medications   0.9%  NaCl infusion (for blood administration) (has no administration in time range)   0.9%  NaCl infusion (for blood administration) (has no administration in time range)   oxymetazoline 0.05 % nasal spray 1 spray (1 spray Each Nostril Given by Provider 8/17/24 1050)   pantoprazole injection 80 mg (80 mg Intravenous Given 8/17/24 1056)   sodium chloride 0.9% bolus 250 mL 250 mL (0 mLs Intravenous Stopped 8/17/24 1133)     Medical Decision Making  Differential diagnosis; GI bleed, epistaxis, anemia, dehydration, acute on chronic renal failure    Nasal balloon placed on the right 4th mild epistaxis.  Gross right dark red blood on rectal  exam as well.  Hemoglobin obtained and very low.  Blood ordered.  We will arrange transfer.    1 unit of O negative emergency release obtained and hung.  An additional unit will be ordered as well.  Protonix ordered.  Very small fluid bolus given patient's dialysis status ordered.  His blood pressure is slightly improving.  Maps above 65.  Patient's family requested [facility] [place] but they do not have GI coverage this weekend.  Given possible GI and ENT sources as well as the need for nephrology we will find alternative destination for transfer.    Patient's blood pressure is maintained with maps above 65 although he is slightly tachycardic.  Does still have some slight oozing from his right naris I adjusted his nasal tampon.  He has had 2 BMs with moderate amount of dark red blood.  He is in the process of getting a 2nd unit of blood.  Awaiting transfer.  Patient is signed out to Dr. Greco at 1800.     Amount and/or Complexity of Data Reviewed  Labs: ordered.    Risk  OTC drugs.  Prescription drug management.                                      Clinical Impression:  Final diagnoses:  [R53.1] Weakness  [K62.5] Rectal bleeding (Primary)  [D62] Acute blood loss anemia  [R04.0] Epistaxis          ED Disposition Condition    Transfer to Another Facility Stable                Ric Saxena MD  08/18/24 9951

## 2024-08-18 PROBLEM — Z71.89 ACP (ADVANCE CARE PLANNING): Status: ACTIVE | Noted: 2024-08-18

## 2024-08-19 LAB
OHS QRS DURATION: 110 MS
OHS QRS DURATION: 112 MS
OHS QTC CALCULATION: 495 MS
OHS QTC CALCULATION: 499 MS

## 2024-08-20 PROBLEM — I5A NONISCHEMIC NONTRAUMATIC MYOCARDIAL INJURY: Status: ACTIVE | Noted: 2024-08-17

## 2024-08-20 PROBLEM — Z71.89 ACP (ADVANCE CARE PLANNING): Status: RESOLVED | Noted: 2024-08-18 | Resolved: 2024-08-20

## 2025-06-02 ENCOUNTER — TELEPHONE (OUTPATIENT)
Dept: HEMATOLOGY/ONCOLOGY | Facility: CLINIC | Age: 75
End: 2025-06-02
Payer: MEDICARE

## 2025-06-03 ENCOUNTER — TELEPHONE (OUTPATIENT)
Dept: HEMATOLOGY/ONCOLOGY | Facility: CLINIC | Age: 75
End: 2025-06-03
Payer: MEDICARE

## 2025-06-09 ENCOUNTER — OFFICE VISIT (OUTPATIENT)
Dept: HEMATOLOGY/ONCOLOGY | Facility: CLINIC | Age: 75
End: 2025-06-09
Payer: MEDICARE

## 2025-06-09 VITALS
HEART RATE: 73 BPM | DIASTOLIC BLOOD PRESSURE: 87 MMHG | SYSTOLIC BLOOD PRESSURE: 148 MMHG | TEMPERATURE: 97 F | WEIGHT: 194.25 LBS | OXYGEN SATURATION: 95 % | BODY MASS INDEX: 27.19 KG/M2 | HEIGHT: 71 IN | RESPIRATION RATE: 16 BRPM

## 2025-06-09 DIAGNOSIS — C08.0: Primary | ICD-10-CM

## 2025-06-09 DIAGNOSIS — C44.311 BASAL CELL CARCINOMA (BCC) OF DORSUM OF NOSE: ICD-10-CM

## 2025-06-09 DIAGNOSIS — N18.4 CKD (CHRONIC KIDNEY DISEASE), STAGE IV: ICD-10-CM

## 2025-06-09 DIAGNOSIS — C44.90 SKIN CANCER: ICD-10-CM

## 2025-06-09 PROCEDURE — 1126F AMNT PAIN NOTED NONE PRSNT: CPT | Mod: CPTII,S$GLB,, | Performed by: NURSE PRACTITIONER

## 2025-06-09 PROCEDURE — 3077F SYST BP >= 140 MM HG: CPT | Mod: CPTII,S$GLB,, | Performed by: NURSE PRACTITIONER

## 2025-06-09 PROCEDURE — 1159F MED LIST DOCD IN RCRD: CPT | Mod: CPTII,S$GLB,, | Performed by: NURSE PRACTITIONER

## 2025-06-09 PROCEDURE — 3079F DIAST BP 80-89 MM HG: CPT | Mod: CPTII,S$GLB,, | Performed by: NURSE PRACTITIONER

## 2025-06-09 PROCEDURE — 3288F FALL RISK ASSESSMENT DOCD: CPT | Mod: CPTII,S$GLB,, | Performed by: NURSE PRACTITIONER

## 2025-06-09 PROCEDURE — 99214 OFFICE O/P EST MOD 30 MIN: CPT | Mod: S$GLB,,, | Performed by: NURSE PRACTITIONER

## 2025-06-09 PROCEDURE — 1101F PT FALLS ASSESS-DOCD LE1/YR: CPT | Mod: CPTII,S$GLB,, | Performed by: NURSE PRACTITIONER

## 2025-06-09 PROCEDURE — 99999 PR PBB SHADOW E&M-EST. PATIENT-LVL V: CPT | Mod: PBBFAC,,, | Performed by: NURSE PRACTITIONER

## 2025-06-09 RX ORDER — B,C/FOLIC/ZINC/SELENOMETH/D3/E 0.8-12.5MG
1 TABLET ORAL
COMMUNITY
Start: 2025-01-29

## 2025-06-09 RX ORDER — NYSTATIN 500000 [USP'U]/1
500000 TABLET, COATED ORAL
COMMUNITY
Start: 2025-01-26

## 2025-06-09 RX ORDER — CINACALCET HYDROCHLORIDE 30 MG/1
1 TABLET, COATED ORAL
COMMUNITY
Start: 2024-12-15

## 2025-06-09 RX ORDER — APIXABAN 2.5 MG/1
2.5 TABLET, FILM COATED ORAL 2 TIMES DAILY
COMMUNITY

## 2025-06-09 RX ORDER — LACTULOSE 10 G/15ML
30 SOLUTION ORAL; RECTAL
COMMUNITY
Start: 2025-01-26

## 2025-06-09 RX ORDER — NAPROXEN SODIUM 220 MG/1
1 TABLET, FILM COATED ORAL
COMMUNITY
Start: 2025-04-08

## 2025-06-09 RX ORDER — CALCIUM CARBONATE 300MG(750)
1 TABLET,CHEWABLE ORAL
COMMUNITY
Start: 2024-11-12

## 2025-06-09 RX ORDER — METOPROLOL TARTRATE 25 MG/1
25 TABLET, FILM COATED ORAL
COMMUNITY
Start: 2024-10-06

## 2025-06-09 RX ORDER — SUCRALFATE 1 G/1
1 TABLET ORAL
COMMUNITY
Start: 2024-08-19
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
## 2025-06-12 NOTE — TELEPHONE ENCOUNTER
Copied from CRM #[id number]. Topic: General Inquiry - Patient Advice  >> Jun 12, 2025  3:44 PM Deja wrote:  Type:  Needs Medical Advice    Who Called: pt  Symptoms (please be specific):    How long has patient had these symptoms:    Pharmacy name and phone #:    Would the patient rather a call back or a response via [facility]? call  Best Call Back Number: [phone]    Additional Information: pt would like to speak with nurse about medication

## 2025-06-12 NOTE — TELEPHONE ENCOUNTER
Pt asking for Julissa NP to refill his prescription for Mometasone cream.  Will send request to Julissa.

## 2025-06-13 RX ORDER — MOMETASONE FUROATE 1 MG/G
1 CREAM TOPICAL
OUTPATIENT
Start: 2025-06-13

## 2025-06-13 NOTE — TELEPHONE ENCOUNTER
Called pt, no answer no VM.    S/W Ms Mccullough and explained that Dr Crawford and JEANNIE Costa do not recommend continuing usage of Mometasone and/or steroid creams on his cancer and will not refill pt's prescription.  She verbalized understanding.

## 2025-06-18 ENCOUNTER — TELEPHONE (OUTPATIENT)
Dept: HEMATOLOGY/ONCOLOGY | Facility: CLINIC | Age: 75
End: 2025-06-18
Payer: MEDICARE

## 2025-06-18 NOTE — TELEPHONE ENCOUNTER
Discussed upcoming PET and appt with Dr Crawford.  All questions answered to Ms Lara' satisfaction.

## 2025-06-18 NOTE — TELEPHONE ENCOUNTER
Copied from CRM #[id number]. Topic: General Inquiry - Patient Advice  >> Jun 18, 2025 10:20 AM Linda wrote:  Type: Needs Medical Advice  Who Called:  Jane (spouse)  Symptoms (please be specific):    How long has patient had these symptoms:    Pharmacy name and phone #:    Best Call Back Number: [phone]  Additional Information: aJne is requesting a call back from [facility] regarding dermatology

## 2025-06-26 ENCOUNTER — HOSPITAL ENCOUNTER (OUTPATIENT)
Dept: RADIOLOGY | Facility: HOSPITAL | Age: 75
Discharge: HOME | End: 2025-06-26
Attending: NURSE PRACTITIONER
Payer: MEDICARE

## 2025-06-26 ENCOUNTER — OFFICE VISIT (OUTPATIENT)
Dept: HEMATOLOGY/ONCOLOGY | Facility: CLINIC | Age: 75
End: 2025-06-26
Payer: MEDICARE

## 2025-06-26 ENCOUNTER — TELEPHONE (OUTPATIENT)
Dept: SURGERY | Facility: CLINIC | Age: 75
End: 2025-06-26
Payer: MEDICARE

## 2025-06-26 VITALS
SYSTOLIC BLOOD PRESSURE: 120 MMHG | WEIGHT: 196 LBS | HEART RATE: 77 BPM | OXYGEN SATURATION: 98 % | BODY MASS INDEX: 27.44 KG/M2 | TEMPERATURE: 97 F | HEIGHT: 71 IN | DIASTOLIC BLOOD PRESSURE: 67 MMHG

## 2025-06-26 DIAGNOSIS — N18.4 CKD (CHRONIC KIDNEY DISEASE), STAGE IV: ICD-10-CM

## 2025-06-26 DIAGNOSIS — C44.311 BASAL CELL CARCINOMA (BCC) OF DORSUM OF NOSE: ICD-10-CM

## 2025-06-26 DIAGNOSIS — C44.90 SKIN CANCER: ICD-10-CM

## 2025-06-26 DIAGNOSIS — C08.0: ICD-10-CM

## 2025-06-26 DIAGNOSIS — C44.320 SQUAMOUS CELL CARCINOMA OF SKIN OF FACE: ICD-10-CM

## 2025-06-26 DIAGNOSIS — N28.89 RENAL MASS: Primary | ICD-10-CM

## 2025-06-26 LAB — GLUCOSE SERPL-MCNC: 124 MG/DL (ref 70–110)

## 2025-06-26 PROCEDURE — A9552 F18 FDG: HCPCS | Mod: PN | Performed by: NURSE PRACTITIONER

## 2025-06-26 PROCEDURE — 99999 PR PBB SHADOW E&M-EST. PATIENT-LVL V: CPT | Mod: PBBFAC,,, | Performed by: STUDENT IN AN ORGANIZED HEALTH CARE EDUCATION/TRAINING PROGRAM

## 2025-06-26 PROCEDURE — 99215 OFFICE O/P EST HI 40 MIN: CPT | Mod: S$GLB,,, | Performed by: STUDENT IN AN ORGANIZED HEALTH CARE EDUCATION/TRAINING PROGRAM

## 2025-06-26 PROCEDURE — 3074F SYST BP LT 130 MM HG: CPT | Mod: CPTII,S$GLB,, | Performed by: STUDENT IN AN ORGANIZED HEALTH CARE EDUCATION/TRAINING PROGRAM

## 2025-06-26 PROCEDURE — 3288F FALL RISK ASSESSMENT DOCD: CPT | Mod: CPTII,S$GLB,, | Performed by: STUDENT IN AN ORGANIZED HEALTH CARE EDUCATION/TRAINING PROGRAM

## 2025-06-26 PROCEDURE — 1159F MED LIST DOCD IN RCRD: CPT | Mod: CPTII,S$GLB,, | Performed by: STUDENT IN AN ORGANIZED HEALTH CARE EDUCATION/TRAINING PROGRAM

## 2025-06-26 PROCEDURE — 3078F DIAST BP <80 MM HG: CPT | Mod: CPTII,S$GLB,, | Performed by: STUDENT IN AN ORGANIZED HEALTH CARE EDUCATION/TRAINING PROGRAM

## 2025-06-26 PROCEDURE — 78815 PET IMAGE W/CT SKULL-THIGH: CPT | Mod: 26,PI,, | Performed by: RADIOLOGY

## 2025-06-26 PROCEDURE — 1101F PT FALLS ASSESS-DOCD LE1/YR: CPT | Mod: CPTII,S$GLB,, | Performed by: STUDENT IN AN ORGANIZED HEALTH CARE EDUCATION/TRAINING PROGRAM

## 2025-06-26 PROCEDURE — 1126F AMNT PAIN NOTED NONE PRSNT: CPT | Mod: CPTII,S$GLB,, | Performed by: STUDENT IN AN ORGANIZED HEALTH CARE EDUCATION/TRAINING PROGRAM

## 2025-06-26 PROCEDURE — 78815 PET IMAGE W/CT SKULL-THIGH: CPT | Mod: TC,PN

## 2025-06-26 RX ORDER — FLUDEOXYGLUCOSE F18 500 MCI/ML
13.2 INJECTION INTRAVENOUS
Status: COMPLETED | OUTPATIENT
Start: 2025-06-26 | End: 2025-06-26

## 2025-06-26 RX ADMIN — FLUDEOXYGLUCOSE F-18 13.2 MILLICURIE: 500 INJECTION INTRAVENOUS at 09:06

## 2025-06-26 NOTE — TELEPHONE ENCOUNTER
RN returned patient's call -- Pt is in lobby and checked in early for appointment with Dr Crawford. RN will let staff know and make accommodations if able to do so.     RN answered questions to patient's satisfaction -- pt voiced understanding of call.       Copied from CRM #[id number]. Topic: General Inquiry - Patient Advice  >> Jun 26, 2025 12:24 PM Linda wrote:  Type: Needs Medical Advice  Who Called:  Jane  Symptoms (please be specific):    How long has patient had these symptoms:    Pharmacy name and phone #:    Best Call Back Number: [phone]  Additional Information: Jane is requesting call back from nurse regarding appt today

## 2025-06-26 NOTE — PROGRESS NOTES
PET Imaging Questionnaire    Are you a Diabetic? Recent Blood Sugar level? No    Are you anemic? Bone Marrow Stimulation Meds? No    Have you had a CT Scan, if so when & where was your last one? Yes -     Have you had a PET Scan, if so when & where was your last one? Yes -     Chemotherapy or currently on Chemotherapy? No    Radiation therapy? No    Surgical History:   Past Surgical History:   Procedure Laterality Date    AORTIC VALVE REPLACEMENT      CORONARY ARTERY BYPASS GRAFT      NASAL CAUTERY N/A 8/18/2024    Procedure: CAUTERIZATION, NOSE;  Surgeon: Krishna Jaramillo MD;  Location: [facility] OR;  Service: ENT;  Laterality: N/A;    NASAL ENDOSCOPY N/A 8/18/2024    Procedure: ENDOSCOPY, NOSE;  Surgeon: Krishna Jaramillo MD;  Location: [facility] OR;  Service: ENT;  Laterality: N/A;        Have you been fasting for at least 6 hours? Yes    Is there any chance you may be pregnant or breastfeeding? No    Assay: 14.0 MCi@:0929   Injection Site:RAC    Residual: .824 mCi@: 0932   Technologist: Adelso Appiah Injected:13.2mCi
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
## 2025-07-03 ENCOUNTER — TELEPHONE (OUTPATIENT)
Dept: HEMATOLOGY/ONCOLOGY | Facility: CLINIC | Age: 75
End: 2025-07-03
Payer: MEDICARE

## 2025-07-03 NOTE — TELEPHONE ENCOUNTER
Discussed that plan of care was for a bx of renal mass at [facility].  I will contact pt when I have more information.

## 2025-07-03 NOTE — TELEPHONE ENCOUNTER
Copied from CRM #[id number]. Topic: General Inquiry - Patient Advice  >> Jul 3, 2025  3:09 PM Alberta wrote:  Type: Needs Medical Advice  Who Called:  Jane peñaloza)  Symptoms (please be specific):    How long has patient had these symptoms:    Pharmacy name and phone #:    Best Call Back Number:   Additional Information: Jane is requesting a call back fro .

## 2025-07-08 ENCOUNTER — TELEPHONE (OUTPATIENT)
Dept: HEMATOLOGY/ONCOLOGY | Facility: CLINIC | Age: 75
End: 2025-07-08
Payer: MEDICARE

## 2025-07-08 NOTE — TELEPHONE ENCOUNTER
Copied from CRM #[id number]. Topic: General Inquiry - Patient Advice  >> Jul 8, 2025  2:37 PM Gladys wrote:  Type: Needs Medical Advice  Who Called:  Patient  Best Call Back Number: [phone]    Additional Information: Can we please call pt back to advise, stated she needs to speak to . Thank You

## 2025-07-08 NOTE — TELEPHONE ENCOUNTER
Ms Mccullough calling to notify me that Dr Pierre's office has not received pt's PET.  Refaxed pt's PET and recent clinic note to Dr Pierre  P: [phone]  F:[phone]    Discussed FNA ordered by Dr Crawford, Ms Mccullough states that pt does not want to go to the [facility] for FNA.  Ms Mccullough will update us if Dr Pierre orders the bx for pt.     I called Dr Pierre's office and verified fax receipt.

## 2025-07-10 ENCOUNTER — TELEPHONE (OUTPATIENT)
Dept: INTERVENTIONAL RADIOLOGY/VASCULAR | Facility: CLINIC | Age: 75
End: 2025-07-10
Payer: MEDICARE

## 2025-07-11 DIAGNOSIS — C08.0: ICD-10-CM

## 2025-07-11 DIAGNOSIS — C44.311 BASAL CELL CARCINOMA (BCC) OF DORSUM OF NOSE: Primary | ICD-10-CM

## 2025-07-16 ENCOUNTER — TELEPHONE (OUTPATIENT)
Dept: HEMATOLOGY/ONCOLOGY | Facility: CLINIC | Age: 75
End: 2025-07-16
Payer: MEDICARE

## 2025-07-16 NOTE — TELEPHONE ENCOUNTER
Copied from CRM #[id number]. Topic: General Inquiry - Patient Advice  >> Jul 16, 2025 11:43 MISSAEL Mckeon wrote:  Type: Needs Medical Advice  Who Called:  Jane   Symptoms (please be specific):    How long has patient had these symptoms:    Pharmacy name and phone #:    Best Call Back Number: [phone]  Additional Information: Jane is requesting a call back from  regarding his last visit

## 2025-07-16 NOTE — TELEPHONE ENCOUNTER
Per pt's significant other pt does not want a bx, he wants his nose fixed. She states that he may decided to do the bx with Dr Pierre in [place] but will not go to [place] for the procedure.    I called Dr Pierre's office and confirmed that pt's records were received.  Dr Pierre's nurse does not have any information on Dr Pierre's advice/plan of care for pt.  I provided Dr Crawford's cell for Dr Pierre to contact her to discuss plan of care/ bx status. Will update Dr Crawford of above.   I called back Jamel and advised them to contact Dr Pierre to discuss bx being done in [place].

## 2025-07-17 ENCOUNTER — TELEPHONE (OUTPATIENT)
Dept: HEMATOLOGY/ONCOLOGY | Facility: CLINIC | Age: 75
End: 2025-07-17
Payer: MEDICARE

## 2025-07-17 NOTE — TELEPHONE ENCOUNTER
Copied from CRM #[id number]. Topic: General Inquiry - Patient Advice  >> Jul 17, 2025  2:19 PM Linda wrote:  Type: Needs Medical Advice  Who Called:  Jane   Symptoms (please be specific):    How long has patient had these symptoms:    Pharmacy name and phone #:    Best Call Back Number: [phone]  Additional Information: Jane is requesting a call back from [place] regarding info that has to be  sent  to Dr Pierre

## 2025-07-17 NOTE — TELEPHONE ENCOUNTER
Ms Mccullough calling to request the phone number and fax number for Dr Pierre's office.  Provided the information.  Ms Mccullough verbalized gratitude and will call back with any questions or concerns.

## 2025-07-21 ENCOUNTER — TELEPHONE (OUTPATIENT)
Dept: SURGERY | Facility: CLINIC | Age: 75
End: 2025-07-21
Payer: MEDICARE

## 2025-07-21 NOTE — TELEPHONE ENCOUNTER
Copied from CRM #[id number]. Topic: General Inquiry - Patient Advice  >> Jul 21, 2025 12:34 PM Linda wrote:  Type: Needs Medical Advice  Who Called:  Jamel   Symptoms (please be specific):    How long has patient had these symptoms:    Pharmacy name and phone #:    Best Call Back Number: [phone]  Additional Information: Jamel is requesting a call back from nurse regarding a biopsy